# Patient Record
Sex: MALE | Race: WHITE | HISPANIC OR LATINO | Employment: OTHER | URBAN - METROPOLITAN AREA
[De-identification: names, ages, dates, MRNs, and addresses within clinical notes are randomized per-mention and may not be internally consistent; named-entity substitution may affect disease eponyms.]

---

## 2017-02-03 ENCOUNTER — ALLSCRIPTS OFFICE VISIT (OUTPATIENT)
Dept: OTHER | Facility: OTHER | Age: 82
End: 2017-02-03

## 2017-02-03 ENCOUNTER — GENERIC CONVERSION - ENCOUNTER (OUTPATIENT)
Dept: OTHER | Facility: OTHER | Age: 82
End: 2017-02-03

## 2017-02-03 LAB — HBA1C MFR BLD HPLC: 7.4 %

## 2017-02-06 ENCOUNTER — ALLSCRIPTS OFFICE VISIT (OUTPATIENT)
Dept: OTHER | Facility: OTHER | Age: 82
End: 2017-02-06

## 2017-02-06 DIAGNOSIS — N18.4 CHRONIC KIDNEY DISEASE, STAGE IV (SEVERE) (HCC): ICD-10-CM

## 2017-02-06 DIAGNOSIS — N25.81 SECONDARY HYPERPARATHYROIDISM OF RENAL ORIGIN (HCC): ICD-10-CM

## 2017-02-08 ENCOUNTER — TRANSCRIBE ORDERS (OUTPATIENT)
Dept: LAB | Facility: HOSPITAL | Age: 82
End: 2017-02-08

## 2017-02-08 ENCOUNTER — APPOINTMENT (OUTPATIENT)
Dept: LAB | Facility: HOSPITAL | Age: 82
End: 2017-02-08
Attending: INTERNAL MEDICINE
Payer: COMMERCIAL

## 2017-02-08 DIAGNOSIS — N18.4 CHRONIC KIDNEY DISEASE, STAGE IV (SEVERE) (HCC): ICD-10-CM

## 2017-02-08 DIAGNOSIS — N25.81 SECONDARY HYPERPARATHYROIDISM OF RENAL ORIGIN (HCC): ICD-10-CM

## 2017-02-08 LAB
ANION GAP SERPL CALCULATED.3IONS-SCNC: 7 MMOL/L (ref 4–13)
BUN SERPL-MCNC: 33 MG/DL (ref 5–25)
CALCIUM SERPL-MCNC: 8.6 MG/DL (ref 8.3–10.1)
CHLORIDE SERPL-SCNC: 114 MMOL/L (ref 100–108)
CO2 SERPL-SCNC: 24 MMOL/L (ref 21–32)
CREAT SERPL-MCNC: 2.6 MG/DL (ref 0.6–1.3)
GFR SERPL CREATININE-BSD FRML MDRD: 23.9 ML/MIN/1.73SQ M
GLUCOSE SERPL-MCNC: 247 MG/DL (ref 65–140)
MAGNESIUM SERPL-MCNC: 1.9 MG/DL (ref 1.6–2.6)
PHOSPHATE SERPL-MCNC: 2.3 MG/DL (ref 2.3–4.1)
POTASSIUM SERPL-SCNC: 4.1 MMOL/L (ref 3.5–5.3)
PTH-INTACT SERPL-MCNC: 67.4 PG/ML (ref 14–72)
SODIUM SERPL-SCNC: 145 MMOL/L (ref 136–145)

## 2017-02-08 PROCEDURE — 84100 ASSAY OF PHOSPHORUS: CPT

## 2017-02-08 PROCEDURE — 80048 BASIC METABOLIC PNL TOTAL CA: CPT

## 2017-02-08 PROCEDURE — 83735 ASSAY OF MAGNESIUM: CPT

## 2017-02-08 PROCEDURE — 83970 ASSAY OF PARATHORMONE: CPT

## 2017-02-08 PROCEDURE — 36415 COLL VENOUS BLD VENIPUNCTURE: CPT

## 2017-03-17 ENCOUNTER — ALLSCRIPTS OFFICE VISIT (OUTPATIENT)
Dept: OTHER | Facility: OTHER | Age: 82
End: 2017-03-17

## 2017-03-28 ENCOUNTER — RX ONLY (RX ONLY)
Age: 82
End: 2017-03-28

## 2017-03-28 ENCOUNTER — DOCTOR'S OFFICE (OUTPATIENT)
Dept: URBAN - METROPOLITAN AREA CLINIC 136 | Facility: CLINIC | Age: 82
Setting detail: OPHTHALMOLOGY
End: 2017-03-28
Payer: COMMERCIAL

## 2017-03-28 DIAGNOSIS — E11.3313: ICD-10-CM

## 2017-03-28 DIAGNOSIS — H34.8112: ICD-10-CM

## 2017-03-28 DIAGNOSIS — E11.3312: ICD-10-CM

## 2017-03-28 DIAGNOSIS — E10.36: ICD-10-CM

## 2017-03-28 DIAGNOSIS — E13.36: ICD-10-CM

## 2017-03-28 DIAGNOSIS — E11.3311: ICD-10-CM

## 2017-03-28 PROCEDURE — 92134 CPTRZ OPH DX IMG PST SGM RTA: CPT | Performed by: OPHTHALMOLOGY

## 2017-03-28 PROCEDURE — 92004 COMPRE OPH EXAM NEW PT 1/>: CPT | Performed by: OPHTHALMOLOGY

## 2017-03-28 PROCEDURE — 92250 FUNDUS PHOTOGRAPHY W/I&R: CPT | Performed by: OPHTHALMOLOGY

## 2017-03-28 PROCEDURE — 92235 FLUORESCEIN ANGRPH MLTIFRAME: CPT | Performed by: OPHTHALMOLOGY

## 2017-03-28 ASSESSMENT — CONFRONTATIONAL VISUAL FIELD TEST (CVF)
OS_FINDINGS: SUPERONASAL DEFECT
OD_FINDINGS: SUPERONASAL DEFECT, INFERONASAL DEFECT

## 2017-03-28 ASSESSMENT — CORNEAL SURGICAL SCARRING: OS_SCARRING: ANTERIOR STROMAL

## 2017-03-31 PROBLEM — E11.3313 DM TYPE 2; BOTH MOD WITH ME: Status: ACTIVE | Noted: 2017-03-28

## 2017-03-31 PROBLEM — H34.8112: Status: ACTIVE | Noted: 2017-03-28

## 2017-03-31 PROBLEM — E11.36 CATARACT DIABETIC: Status: ACTIVE | Noted: 2017-03-28

## 2017-03-31 PROBLEM — H43.813 POSTERIOR VITREOUS DETACHMENT; BOTH EYES: Status: ACTIVE | Noted: 2017-03-28

## 2017-03-31 PROBLEM — E13.36 CATARACT DIABETIC: Status: ACTIVE | Noted: 2017-03-28

## 2017-03-31 PROBLEM — E10.36 CATARACT DIABETIC: Status: ACTIVE | Noted: 2017-03-28

## 2017-03-31 ASSESSMENT — REFRACTION_MANIFEST
OD_VA2: 20/
OS_VA1: 20/
OS_VA2: 20/
OD_VA1: 20/
OU_VA: 20/
OD_VA3: 20/
OU_VA: 20/
OS_VA3: 20/
OD_VA1: 20/
OS_VA1: 20/
OS_VA3: 20/
OD_VA3: 20/
OS_VA2: 20/
OD_VA2: 20/
OD_VA2: 20/
OD_VA3: 20/
OS_VA3: 20/
OS_VA2: 20/
OS_VA1: 20/
OU_VA: 20/
OD_VA1: 20/

## 2017-03-31 ASSESSMENT — REFRACTION_CURRENTRX
OS_OVR_VA: 20/
OS_OVR_VA: 20/
OD_OVR_VA: 20/
OS_OVR_VA: 20/
OD_OVR_VA: 20/
OD_OVR_VA: 20/

## 2017-03-31 ASSESSMENT — VISUAL ACUITY
OS_BCVA: 20/300
OD_BCVA: 20/30-1

## 2017-05-01 ENCOUNTER — ALLSCRIPTS OFFICE VISIT (OUTPATIENT)
Dept: OTHER | Facility: OTHER | Age: 82
End: 2017-05-01

## 2017-05-24 ENCOUNTER — ALLSCRIPTS OFFICE VISIT (OUTPATIENT)
Dept: OTHER | Facility: OTHER | Age: 82
End: 2017-05-24

## 2017-05-24 DIAGNOSIS — N18.4 CHRONIC KIDNEY DISEASE, STAGE IV (SEVERE) (HCC): ICD-10-CM

## 2017-05-24 LAB — HBA1C MFR BLD HPLC: 7.6 %

## 2017-05-31 ENCOUNTER — APPOINTMENT (OUTPATIENT)
Dept: LAB | Facility: HOSPITAL | Age: 82
End: 2017-05-31
Payer: COMMERCIAL

## 2017-05-31 DIAGNOSIS — N18.4 CHRONIC KIDNEY DISEASE, STAGE IV (SEVERE) (HCC): ICD-10-CM

## 2017-05-31 LAB
ANION GAP SERPL CALCULATED.3IONS-SCNC: 8 MMOL/L (ref 4–13)
BUN SERPL-MCNC: 41 MG/DL (ref 5–25)
CALCIUM SERPL-MCNC: 8.8 MG/DL (ref 8.3–10.1)
CHLORIDE SERPL-SCNC: 117 MMOL/L (ref 100–108)
CO2 SERPL-SCNC: 19 MMOL/L (ref 21–32)
CREAT SERPL-MCNC: 2.66 MG/DL (ref 0.6–1.3)
GFR SERPL CREATININE-BSD FRML MDRD: 23.2 ML/MIN/1.73SQ M
GLUCOSE P FAST SERPL-MCNC: 89 MG/DL (ref 65–99)
POTASSIUM SERPL-SCNC: 4 MMOL/L (ref 3.5–5.3)
SODIUM SERPL-SCNC: 144 MMOL/L (ref 136–145)

## 2017-05-31 PROCEDURE — 36415 COLL VENOUS BLD VENIPUNCTURE: CPT

## 2017-05-31 PROCEDURE — 80048 BASIC METABOLIC PNL TOTAL CA: CPT

## 2017-06-01 ENCOUNTER — ALLSCRIPTS OFFICE VISIT (OUTPATIENT)
Dept: OTHER | Facility: OTHER | Age: 82
End: 2017-06-01

## 2017-06-30 DIAGNOSIS — E55.9 VITAMIN D DEFICIENCY: ICD-10-CM

## 2017-06-30 DIAGNOSIS — N25.81 SECONDARY HYPERPARATHYROIDISM OF RENAL ORIGIN (HCC): ICD-10-CM

## 2017-08-01 DIAGNOSIS — E11.9 TYPE 2 DIABETES MELLITUS WITHOUT COMPLICATIONS (HCC): ICD-10-CM

## 2017-08-01 DIAGNOSIS — E78.5 HYPERLIPIDEMIA: ICD-10-CM

## 2017-08-01 DIAGNOSIS — I73.9 PERIPHERAL VASCULAR DISEASE (HCC): ICD-10-CM

## 2017-08-01 DIAGNOSIS — N18.4 CHRONIC KIDNEY DISEASE, STAGE IV (SEVERE) (HCC): ICD-10-CM

## 2017-08-01 DIAGNOSIS — N25.81 SECONDARY HYPERPARATHYROIDISM OF RENAL ORIGIN (HCC): ICD-10-CM

## 2017-08-10 ENCOUNTER — ALLSCRIPTS OFFICE VISIT (OUTPATIENT)
Dept: OTHER | Facility: OTHER | Age: 82
End: 2017-08-10

## 2017-08-21 ENCOUNTER — GENERIC CONVERSION - ENCOUNTER (OUTPATIENT)
Dept: OTHER | Facility: OTHER | Age: 82
End: 2017-08-21

## 2017-09-12 ENCOUNTER — HOSPITAL ENCOUNTER (OUTPATIENT)
Dept: NON INVASIVE DIAGNOSTICS | Facility: CLINIC | Age: 82
Discharge: HOME | End: 2017-09-12
Payer: COMMERCIAL

## 2017-09-12 DIAGNOSIS — I73.9 PERIPHERAL VASCULAR DISEASE (HCC): ICD-10-CM

## 2017-09-12 PROCEDURE — 93925 LOWER EXTREMITY STUDY: CPT

## 2017-09-12 PROCEDURE — 93923 UPR/LXTR ART STDY 3+ LVLS: CPT

## 2017-09-18 ENCOUNTER — TRANSCRIBE ORDERS (OUTPATIENT)
Dept: LAB | Facility: HOSPITAL | Age: 82
End: 2017-09-18

## 2017-09-18 ENCOUNTER — APPOINTMENT (OUTPATIENT)
Dept: LAB | Facility: HOSPITAL | Age: 82
End: 2017-09-18
Payer: COMMERCIAL

## 2017-09-18 DIAGNOSIS — E11.9 TYPE 2 DIABETES MELLITUS WITHOUT COMPLICATIONS (HCC): ICD-10-CM

## 2017-09-18 DIAGNOSIS — E78.5 HYPERLIPIDEMIA: ICD-10-CM

## 2017-09-18 DIAGNOSIS — N18.4 CHRONIC KIDNEY DISEASE, STAGE IV (SEVERE) (HCC): ICD-10-CM

## 2017-09-18 LAB
ANION GAP SERPL CALCULATED.3IONS-SCNC: 7 MMOL/L (ref 4–13)
BUN SERPL-MCNC: 40 MG/DL (ref 5–25)
CALCIUM SERPL-MCNC: 8.9 MG/DL (ref 8.3–10.1)
CHLORIDE SERPL-SCNC: 116 MMOL/L (ref 100–108)
CHOLEST SERPL-MCNC: 117 MG/DL (ref 50–200)
CO2 SERPL-SCNC: 20 MMOL/L (ref 21–32)
CREAT SERPL-MCNC: 2.88 MG/DL (ref 0.6–1.3)
CREAT UR-MCNC: <13 MG/DL
EST. AVERAGE GLUCOSE BLD GHB EST-MCNC: 200 MG/DL
GFR SERPL CREATININE-BSD FRML MDRD: 20 ML/MIN/1.73SQ M
GLUCOSE P FAST SERPL-MCNC: 156 MG/DL (ref 65–99)
HBA1C MFR BLD: 8.6 % (ref 4.2–6.3)
HDLC SERPL-MCNC: 26 MG/DL (ref 40–60)
LDLC SERPL CALC-MCNC: 51 MG/DL (ref 0–100)
MICROALBUMIN UR-MCNC: 57.7 MG/L (ref 0–20)
MICROALBUMIN/CREAT 24H UR: >444 MG/G CREATININE (ref 0–30)
PHOSPHATE SERPL-MCNC: 1.9 MG/DL (ref 2.3–4.1)
POTASSIUM SERPL-SCNC: 3.8 MMOL/L (ref 3.5–5.3)
PTH-INTACT SERPL-MCNC: 51.6 PG/ML (ref 14–72)
SODIUM SERPL-SCNC: 143 MMOL/L (ref 136–145)
TRIGL SERPL-MCNC: 201 MG/DL

## 2017-09-18 PROCEDURE — 36415 COLL VENOUS BLD VENIPUNCTURE: CPT

## 2017-09-18 PROCEDURE — 83970 ASSAY OF PARATHORMONE: CPT

## 2017-09-18 PROCEDURE — 80061 LIPID PANEL: CPT

## 2017-09-18 PROCEDURE — 84100 ASSAY OF PHOSPHORUS: CPT

## 2017-09-18 PROCEDURE — 82570 ASSAY OF URINE CREATININE: CPT

## 2017-09-18 PROCEDURE — 80048 BASIC METABOLIC PNL TOTAL CA: CPT

## 2017-09-18 PROCEDURE — 82043 UR ALBUMIN QUANTITATIVE: CPT

## 2017-09-18 PROCEDURE — 83036 HEMOGLOBIN GLYCOSYLATED A1C: CPT

## 2017-09-21 ENCOUNTER — ALLSCRIPTS OFFICE VISIT (OUTPATIENT)
Dept: OTHER | Facility: OTHER | Age: 82
End: 2017-09-21

## 2017-10-16 ENCOUNTER — APPOINTMENT (OUTPATIENT)
Dept: LAB | Facility: HOSPITAL | Age: 82
End: 2017-10-16
Attending: INTERNAL MEDICINE
Payer: COMMERCIAL

## 2017-10-16 ENCOUNTER — TRANSCRIBE ORDERS (OUTPATIENT)
Dept: LAB | Facility: HOSPITAL | Age: 82
End: 2017-10-16

## 2017-10-16 DIAGNOSIS — I10 ESSENTIAL (PRIMARY) HYPERTENSION: ICD-10-CM

## 2017-10-16 DIAGNOSIS — N18.4 CHRONIC KIDNEY DISEASE, STAGE IV (SEVERE) (HCC): ICD-10-CM

## 2017-10-16 DIAGNOSIS — E55.9 VITAMIN D DEFICIENCY: ICD-10-CM

## 2017-10-16 LAB
25(OH)D3 SERPL-MCNC: 32.7 NG/ML (ref 30–100)
ANION GAP SERPL CALCULATED.3IONS-SCNC: 10 MMOL/L (ref 4–13)
BUN SERPL-MCNC: 38 MG/DL (ref 5–25)
CALCIUM SERPL-MCNC: 8.6 MG/DL (ref 8.3–10.1)
CHLORIDE SERPL-SCNC: 117 MMOL/L (ref 100–108)
CO2 SERPL-SCNC: 17 MMOL/L (ref 21–32)
CREAT SERPL-MCNC: 2.86 MG/DL (ref 0.6–1.3)
CREAT UR-MCNC: 62.6 MG/DL
GFR SERPL CREATININE-BSD FRML MDRD: 20 ML/MIN/1.73SQ M
GLUCOSE P FAST SERPL-MCNC: 146 MG/DL (ref 65–99)
POTASSIUM SERPL-SCNC: 3.5 MMOL/L (ref 3.5–5.3)
PROT UR-MCNC: 73 MG/DL
PROT/CREAT UR: 1.17 MG/G{CREAT} (ref 0–0.1)
SODIUM SERPL-SCNC: 144 MMOL/L (ref 136–145)

## 2017-10-16 PROCEDURE — 84156 ASSAY OF PROTEIN URINE: CPT

## 2017-10-16 PROCEDURE — 82306 VITAMIN D 25 HYDROXY: CPT

## 2017-10-16 PROCEDURE — 36415 COLL VENOUS BLD VENIPUNCTURE: CPT

## 2017-10-16 PROCEDURE — 80048 BASIC METABOLIC PNL TOTAL CA: CPT

## 2017-10-16 PROCEDURE — 82570 ASSAY OF URINE CREATININE: CPT

## 2017-10-18 ENCOUNTER — GENERIC CONVERSION - ENCOUNTER (OUTPATIENT)
Dept: OTHER | Facility: OTHER | Age: 82
End: 2017-10-18

## 2017-10-27 NOTE — PROGRESS NOTES
Assessment  1  Chronic kidney disease, stage 4 (severe) (585 4) (N18 4)   2  Benign essential hypertension (401 1) (I10)   3  Hypomagnesemia (275 2) (E83 42)   4  Metabolic acidosis (578 5) (E87 2)   5  Vitamin D deficiency (268 9) (E55 9)   6  Secondary hyperparathyroidism of renal origin (588 81) (N25 81)    Plan  Chronic kidney disease, stage 4 (severe)    · Sodium Bicarbonate 650 MG Oral Tablet; TAKE 2 TABLETS TWICE DAILY WITH  MEALS   Rx By: Bright Biswas; Dispense: 30 Days ; #:120 Tablet; Refill: 3;For: Chronic kidney disease, stage 4 (severe); CHERELLE = N; Verified Transmission to [facility] ; Last Updated By: [employer]; 9/21/2017 2:21:13 PM   · (1) BASIC METABOLIC PROFILE; Status:Active; Requested WBP:[id number];    Perform:[facility]; Due:16Oct2018; Ordered; For:Chronic kidney disease, stage 4 (severe); Ordered By:Shoshana Estevez;   · (1) URINE PROTEIN CREATININE RATIO; Status:Active; Requested OYU:[id number];    Perform:[facility]; Due:16Oct2018; Ordered; For:Chronic kidney disease, stage 4 (severe); Ordered By:Shoshana Estevez;  Vitamin D deficiency    · (1) VITAMIN D 25-HYDROXY; Status:Active; Requested XED:[id number];    Perform:[facility]; Due:16Oct2018; Ordered; For:Vitamin D deficiency; Ordered By:Shoshana Estevez;    Discussion/Summary    1  CKD stage 4 likely due to diabetes/hypertension/hydronephrosis s/p urostomy, sCr 2 88 Sept 2017 with eGFR 23ml/min per CKD EPI equationis chronic and follows with urology, s/p urostomy as bladder removednote, 10 cm kidneys b/l with increased echogenicity bilaterally as well on renal u/s performed March 2016  Hep B/C, complements, ANCA and IgA negative/normalto avoid NSAIDs(ibuprofen, aleve, advil, motrin)to kidney smart education - Amharic speaking only, needs phone number updatedfor repeat BMP monthly, next in October 2017would not want dialysis if needed as 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  hypertension - BP well controlled for age, no longer on lisinopril  metabolic acidosis - was not taking sodium bicarbonate as he was told by Dr Kinga Johnson not to take this  His bicarb level has dropped  Will restart sodium bicarbonate 1300mg twice dailyF/u repeat BMP in 1 month to assess need for change  secondary hyperparathyroidism of renal origin - PTH normalized with low phos  - PTH 67-->51 6, phos 1 9  vitamin D level as never checked  proteinuria - UpCr 0 78, his is likely d/t diabetic nephropathy  off ACEI due to normal BP  Will repeat UpCr and restart low dose ACEi if needed  DM2 - last A1C 7 6--> 8 6 (uncontrolled)   continue lantus and humalog per primary doctor  Need adequate glucose control to slow progression of CKD  vitamin D deficiency - takes vitamin D every 2 weeks but is unsure as medication list is old  F/u vitamin D level  in 2-3 months with repeat BMP q1-2 months - next check mid October 2017  to bring medications to the office with you  The patient was counseled regarding diagnostic results,-- instructions for management,-- prognosis  Indication for Services: CKD Stage 4/5  Reason For Visit  CKD4      History of Present Illness  82 yo M with hx of CKD stage 4, DM2, HTN, bladder cancer presents in follow up of CKD stage 4  Una Pearce [id number] feels well today  recent hospitalization/illnesses  No recent medication changes  His insulin dose at night was changed 2 weeks ago  - insulin recently changed  He feels the change in insulin is not helping and his blood sugars are high  - well controlledbladder and prostate cancer s/p surgery - follows with urology      Review of Systems    Constitutional: no fever-- and-- no chills  Integumentary: no rashes  Gastrointestinal: no abdominal pain,-- no constipation,-- no nausea,-- no diarrhea-- and-- no vomiting  Respiratory: cough-- and-- +has a cold, but-- no shortness of breath     Cardiovascular: LE edema better first thing in the morning-- and-- lower extremity edema, but-- no chest pain  Musculoskeletal: +neck pain, but-- no joint pain-- and-- no joint swelling  Neurological: no headache,-- no lightheadedness-- and-- no dizziness  Genitourinary: +urostomy, but-- no dysuria-- and-- no hematuria  Eyes: eyesight problems-- and-- +decreased visiion  ENT: hearing loss  Psychiatric: no anxiety-- and-- no depression  ROS reviewed  Past Medical History    The active problems and past medical history were reviewed and updated today  Surgical History    The surgical history was reviewed and updated today  Family History    The family history was reviewed and updated today  Social History  The social history was reviewed and updated today  The social history was reviewed and is unchanged  Current Meds   1  Albuterol Sulfate (5 MG/ML) 0 5% Inhalation Nebulization Solution; USE AS DIRECTED; Therapy: 92TWJ7781 to (Last Rx:32Gjl6541)  Requested for: 53Tad5654 Ordered   2  Aspirin 81 MG Oral Tablet Delayed Release; take 1 tablet every day; Therapy: 02DJA3223 to (Evaluate:83Cna6157)  Requested for: 53Rzi3515; Last   Rx:12Owc9027 Ordered   3  FreeStyle Lancets Miscellaneous; TESTING THREE TIMES A DAY; Therapy: 66SZJ8851 to ([id number])  Requested for: 31Hlb9185; Last   Rx:24Fub7620 Ordered   4  FreeStyle Lite Device; Test blood sugar three times daily; Therapy: 03LNO2910 to (Rojas Toscano)  Requested for: 40NPM7828; Last   Rx:06Nbg3918 Ordered   5  FreeStyle Lite Test In Vitro Strip; TEST three times a day; Therapy: 96HPR3689 to (Tayler Cavazos)  Requested for: 99SIV8651; Last   Rx:22Wdp3258 Ordered   6  HumaLOG KwikPen 100 UNIT/ML Subcutaneous Solution Pen-injector; INJECT   SUBCUTANEOUSLY AS DIRECTED  TDD:30 units; Therapy: 76Mkt5373 to (Last [facility])  Requested for: 32Lzb7636; Status:   ACTIVE - Transmit to [facility] Ordered   7   Pen Needles 31G X 6 MM Miscellaneous; USE AS DIRECTED; Therapy: 62FJL6496 to (Evaluate:21Mar2017)  Requested for: 41Ufo1143; Last   Rx:05Mpi3216; Status: ACTIVE - Transmit to Pharmacy - Awaiting Verification Ordered   8  Toujeo SoloStar 300 UNIT/ML Subcutaneous Solution Pen-injector; 60 units at bedtime; Therapy: 27BPF3456 to (Evaluate:35Dqb6300)  Requested for: 77SSA3908; Last   Rx:20Ial8559 Ordered   9  Vitamin D3 56534 UNIT Oral Capsule; take 1 capsule weekly for 8 weeks; Therapy: 75SKI6756 to (Evaluate:08Jun2016)  Requested for: 11XMR2093; Last   Rx:05Afk5061 Ordered    The medication list was reviewed and updated today  Allergies  1  Aspir-81 TBEC    Vitals  Vital Signs    Recorded: 14EXV3624 93:42ZL   Systolic 623   Diastolic 80   Height 5 ft 9 in   Weight 184 lb 4 00 oz   BMI Calculated 27 21   BSA Calculated 1 99     Physical Exam    Constitutional: General appearance: No acute distress, well appearing and well nourished  ENT: External ears and nose appear normal      Eyes: Anicteric sclerae  Pulmonary: Respiratory effort: No increased work of breathing or signs of respiratory distress  -- Auscultation of lungs: Clear to auscultation  Cardiovascular: Auscultation of heart: Normal rate and rhythm, normal S1 and S2, without murmurs  Abdomen: Abnormal  -- +urostomy  Extremities: No cyanosis, clubbing or edema  Rash: No rash present     Neurologic: Non Focal      Psychiatric: Orientation to person, place, and time: Normal  -- and-- Mood and affect: Normal        Results/Data  (1) BASIC METABOLIC PROFILE 47SXZ8167 08:51AM Roldant[facility] Ernandezs Order Number: [id number]     Test Name Result Flag Reference   SODIUM 143 mmol/L  136-145   POTASSIUM 3 8 mmol/L  3 5-5 3   CHLORIDE 116 mmol/L H 100-108   CARBON DIOXIDE 20 mmol/L L 21-32   ANION GAP (CALC) 7 mmol/L  4-13   BLOOD UREA NITROGEN 40 mg/dL H 5-25   CREATININE 2 88 mg/dL H 0 60-1 30   Standardized to IDMS reference method   CALCIUM 8 9 mg/dL 8  3-10 1   eGFR 20 ml/min/1 73sq m     [facility] Disease Education Program recommendations are as follows:  GFR calculation is accurate only with a steady state creatinine  Chronic Kidney disease less than 60 ml/min/1 73 sq  meters  Kidney failure less than 15 ml/min/1 73 sq  meters  GLUCOSE FASTING 156 mg/dL H 65-99   Specimen collection should occur prior to Sulfasalazine administration due to the potential for falsely depressed results  Specimen collection should occur prior to Sulfapyridine administration due to the potential for falsely elevated results  (1) HEMOGLOBIN A1C 57Igd6741 08:51AM Electa Graft   TW Order Number: [id number]     Test Name Result Flag Reference   HEMOGLOBIN A1C 8 6 % H 4 2-6 3   EST  AVG  GLUCOSE 200 mg/dl       (1) LIPID PANEL FASTING W DIRECT LDL REFLEX 98Xpv1351 08:51AM [facility] Order Number: [id number]     Test Name Result Flag Reference   CHOLESTEROL 117 mg/dL     LDL CHOLESTEROL CALCULATED 51 mg/dL  0-100   Triglyceride:        Normal <150 mg/dl   Borderline High 150-199 mg/dl   High 200-499 mg/dl   Very High >499 mg/dl      Cholesterol:       Desirable <200 mg/dl    Borderline High 200-239 mg/dl    High >239 mg/dl      HDL Cholesterol:       High>59 mg/dL    Low <41 mg/dL      HDL Cholesterol:       High>59 mg/dL    Low <41 mg/dL      This screening LDL is a calculated result  It does not have the accuracy of the Direct Measured LDL in the monitoring of patients with hyperlipidemia and/or statin therapy  Direct Measure LDL (VEO943) must be ordered separately in these patients  TRIGLYCERIDES 201 mg/dL H <=150   Specimen collection should occur prior to N-Acetylcysteine or Metamizole administration due to the potential for falsely depressed results  HDL,DIRECT 26 mg/dL L 40-60   Specimen collection should occur prior to Metamizole administration due to the potential for falsley depressed results       (1) MICROALBUMIN CREATININE RATIO, RANDOM URINE 62Prz7353 08:51AM [employer] [facility] Order Number: [id number]     Test Name Result Flag Reference   MICROALBUMIN/ CREAT R >444 mg/g creatinine H 0-30   MICROALBUMIN,URINE 57 7 mg/L H 0 0-20 0   CREATININE URINE <13 0 mg/dL       (1) PTH N-TERMINAL (INTACT) 18Sep2017 08:51AM [employer] [facility] Order Number: [id number]     Test Name Result Flag Reference   PARATHYROID HORMONE INTACT 51 6 pg/mL  14 0-72 0     (1) PHOSPHORUS 23Lms6975 08:51AM [employer] [facility] Order Number: [id number]     Test Name Result Flag Reference   PHOSPHORUS 1 9 mg/dL L 2 3-4 1     VAS LOWER LIMB ARTERIAL DUPLEX, COMPLETE BILATERAL/GRAFTS 12Sep2017 01:17PM Noa Aaron Order Number: [id number]    - Patient Instructions: To schedule this appointment, please contact Central Scheduling at [phone]  Test Name Result Flag Reference   VAS LOWER LIMB ARTERIAL DUPLEX, COMPLETE BILATERAL/GRAFTS (Report)     [facility] REPORT   CLINICAL:   Indications: Bilateral PVD, Unspecified [I73 9]  Pt  complaining of knee and ankle pain when walking  Risk Factors: The patient has history of Hypertension, Hyperlipidemia and   Diabetes (Yes)  Operative History   Appendectomy   Prostate surgery for cancer   Right Brachial Pressure: 129/ mmHg, Left Brachial Pressure: 133/ mmHg  FINDINGS:      Segment        Rig Left                        PSV PSV    Common Femoral Artery 104  85    Prox Profunda      85  80    Prox SFA        101 102    Mid SFA         96 119    Dist SFA        62  66    Proximal Pop      63  62    Distal Pop       73  83    Dist Post Tibial    99  57    Dist  Ant  Tibial    93 121             CONCLUSION:   Impression:   RIGHT LOWER LIMB:   Diffuse disease throughout the femoral and popliteal arteries without evidence   of a focal stenosis     Ankle/Brachial index: 1 32 (Normal Range)   PVR/ PPG tracings are normal    Metatarsal pressure of 144mmHg   Great toe pressure of 107mmHg, within the healing range   LEFT LOWER LIMB:   Diffuse disease throughout the femoral and popliteal arteries without evidence   of a focal stenosis  Ankle/Brachial index: 1 23 (Normal Range)   PVR/ PPG tracings are normal    Metatarsal pressure of 132mmHg   Great toe pressure of 80mmHg, within the healing range      SIGNATURE:   Electronically Signed by: Rosalinda Wu on 2017-09-12 07:25:55 PM       Health Management  Colon cancer screening   COLONOSCOPY; every 10 years; Next Due: 41ELL7936; Active  DM type 2 (diabetes mellitus, type 2)   (1) LIPID PANEL, FASTING; every 1 year; Last 51GOT3449; Next Due: 74Ahj8633; Overdue  (1) MICROALBUMIN CREATININE RATIO, RANDOM URINE; every 1 year; Last 20SNP5433; Next Due:  34Byc3447; Active  *VB - Eye Exam; every 2 years; Last 12OTB2460; Next Due: 92QUV7048; Active  *VB - Foot Exam; every 1 year; Last 28MEU6534; Next Due: 67RWV6879; Active  Hemoglobin A1c- POC; every 3 months; Last 82EOW7390; Next Due: 06Dpl4727; Overdue  Dyslipidemia   (1) LIPID PANEL, FASTING; every 1 year; Last 10MPF3424; Next Due: 09Vtt4381; Overdue  Need for Tdap vaccination   Tdap (Adacel); every 10 years; Next Due: 99MTN4764; Overdue  Need for Zostavax administration   Zoster (Zostavax); every 1 year; Next Due: 61ZQC7255; Overdue  Health Maintenance   Medicare Annual Wellness Visit; every 1 year; Last 88STF1036; Next Due: 55ZNB1976;  Overdue    Future Appointments    Date/Time Provider Specialty Site   11/07/2017 08:55 AM Specialty Clinic, Urology  [facility] [address]   11/06/2017 01:20 PM Fernie Jurado DO Internal Medicine [facility] [address] PCP   11/09/2017 11:15 AM Fernie Jurado DO Internal Medicine [facility] [address] PCP     Signatures   Electronically signed by : Stormy Currie DO; Sep 21 2017  2:24PM EST                       (Author)

## 2017-11-06 ENCOUNTER — ALLSCRIPTS OFFICE VISIT (OUTPATIENT)
Dept: OTHER | Facility: OTHER | Age: 82
End: 2017-11-06

## 2017-11-07 NOTE — PROGRESS NOTES
Assessment  1  Benign essential hypertension (401 1) (I10)   2  Chronic kidney disease, stage 4 (severe) (585 4) (N18 4)   3  DM type 2 (diabetes mellitus, type 2) (250 00) (E11 9)   4  Diabetic nephropathy (250 40,583 81) (E11 21)    Plan  Benign essential hypertension    · (1) CBC/ PLT (NO DIFF); Status:Active; Requested RQF:17NRN5145;    · (1) TSH WITH FT4 REFLEX; Status:Active; Requested CDB:13FDL1840;   Colostomy in place    · Mell Lynn MD, [facility] (Ophthalmology) Co-Management  *  Status: Hold For - Scheduling  Requested for:  54FFO9709  Care Summary provided  : Yes  DM type 2 (diabetes mellitus, type 2)    · Lisinopril 2 5 MG Oral Tablet; Take 1 tablet daily   · Vitamin D 1000 UNIT Oral Tablet; TAKE 1 TABLET BY MOUTH ONCE DAILY   · BD Pen Needle Mini U/F 31G X 5 MM Miscellaneous; use as directed FOUR TIMES A DAY TO  INJECT LANTUS AND HUMALOG INSULIN   · FreeStyle Lancets Miscellaneous; TESTING THREE TIMES A DAY   · FreeStyle Lite Test In Vitro Strip; TEST three times a day   · Follow-up visit in 3 months Evaluation and Treatment  Follow-up  Status: Hold For - Scheduling   Requested for: 79TFS1710   · *1 - [facility] Co-Management  eval DM II insulin in 1 month  Status: Hold For -  Scheduling  Requested for: 04JRE6525  Care Summary provided  : Yes  DM type 2 (diabetes mellitus, type 2), Peripheral vascular disease    · Aspirin 81 MG Oral Tablet Delayed Release; take 1 tablet every day  Vitamin D deficiency    · Vitamin D3 46570 UNIT Oral Capsule    Discussion/Summary  Discussion Summary:   check TSH and CBC  DM II- A1c 8 6% this Sept  Sugar log (checks once daily in morning) shows mid-high 100s, but at times 2-300s  Saw podiatry in August 2017  Micro:Cr done 9/17  Saw [facility] on 11/16- due for annual, gave script  Want to avoid hypoglycemia, as had this in the past  Will re-refer to Pharmacy in 1 month for adjustment of regimen  He currently takes Humalog 10 in morning, and Toujeo 50 at night   Will change to Toujeo 60 at night, Humalog 10 with biggest meal of day  Check sugars BID - AM and PM   HTN: Well-controlled  Continue low dose Lisinopril  HL: Continue pravastatin  Lipids done 9/17  CKD IV: Follows with Nephro, due to see them in Dec Hx bladder cancer s/p cystoprostatectomy, chronic hydronephrosis in 2003: Appt Nov 30th with Urology  G1 Diastolic CHF: Per ECHO 3981  Stable, asx  RTC 3 months  Td done 8/16  PCV, PPSV done 7085-4155  May need Zoster  Counseling Documentation With Imm: The patient, patient's family was counseled regarding instructions for management,-- risk factor reductions,-- risks and benefits of treatment options,-- importance of compliance with treatment  total time of encounter was 30 minutes  Medication SE Review and Pt Understands Tx: Possible side effects of new medications were reviewed with the patient/guardian today  The treatment plan was reviewed with the patient/guardian  The patient/guardian understands and agrees with the treatment plan      Chief Complaint  Chief Complaint Chronic Condition [facility] Records: Patient is here today for follow up of chronic conditions described in HPI  History of Present Illness  HPI: Pt here for 3 month follow-up  He states he is doing so-so  He feels a little tired/fatigued, but otherwise has no complaints other than dypsnea on exertion  BP is stable today  Had a urine protein test recently which did show protein in his urine  He follows with Nephrology regularly  He also has an appt coming up with Urology  He continues to take lisinopril  Sugar logs have been showing average high 100s, at times 200s and three hundreds  He has been taking Toujeo 50 at night and Humalog 10 in the morning  No reported dizziness or episodes of hypoglycemia  Hospital Based Practices Required Assessment:   Pain Assessment   the patient states they do not have pain  (on a scale of 0 to 10, the patient rates the pain at 0 )    Prefered Language is  Angolan     Primary Language is  Indonesian  Review of Systems  Complete-Male:   Constitutional: no fever,-- not feeling poorly,-- no recent weight gain-- and-- no chills  Eyes: no eyesight problems  Cardiovascular: no chest pain-- and-- no palpitations  Respiratory: no shortness of breath-- and-- no cough  Gastrointestinal: no abdominal pain,-- no nausea,-- no vomiting,-- no constipation-- and-- no diarrhea  Genitourinary: no dysuria  Musculoskeletal: no arthralgias  Psychiatric: no anxiety-- and-- no depression  ROS Reviewed:   ROS reviewed  Active Problems  1  Adhesive bursitis of left shoulder (726 0) (M75 02)   2  Anemia of chronic disease (285 29) (D63 8)   3  Benign essential hypertension (401 1) (I10)   4  Bladder cancer (188 9) (C67 9)   5  Cervical spinal stenosis (723 0) (M48 02)   6  Chronic kidney disease, stage 4 (severe) (585 4) (N18 4)   7  Claudication (443 9) (I73 9)   8  Colon cancer screening (V76 51) (Z12 11)   9  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 3)   10  Constipation (564 00) (K59 00)   11  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)   12  Diabetic nephropathy (250 40,583 81) (E11 21)   13  Diastolic dysfunction (415 4) (I51 9)   14  Dizziness (780 4) (R42)   15  DM type 2 (diabetes mellitus, type 2) (250 00) (E11 9)   16  Dyslipidemia (272 4) (E78 5)   17  Encounter for screening colonoscopy (V76 51) (Z12 11)   18  Hypomagnesemia (275 2) (E83 42)   19  Leg skin lesion, right (709 9) (L98 9)   20  Lower extremity ulceration (707 10) (L97 909)   21  Metabolic acidosis (089 0) (E87 2)   22  Microscopic hematuria (599 72) (R31 29)   23  Need for prophylactic vaccination and inoculation against influenza (V04 81) (Z23)   24  Need for Tdap vaccination (V06 1) (Z23)   25  Need for vaccination with 13-polyvalent pneumococcal conjugate vaccine (V03 82) (Z23)   26  Need for Zostavax administration (V04 89) (Z23)   27  Non compliance w medication regimen (V15 81) (Z91 14)   28   Peripheral neuropathy (356 9) (G62 9)   29  Peripheral vascular disease (443 9) (I73 9)   30  Seborrheic keratosis (702 19) (L82 1)   31  Secondary hyperparathyroidism of renal origin (588 81) (N25 81)   32  URI (upper respiratory infection) (465 9) (J06 9)   33  Vitamin D deficiency (268 9) (E55 9)    Past Medical History  1  History of Bladder Cancer (V10 51)   2  Urinary tract infection (599 0) (N39 0)  Active Problems And Past Medical History Reviewed: The active problems and past medical history were reviewed and updated today  Surgical History  1  History of Appendectomy   2  History of Bladder Surgery   3  History of Prostate Surgery  Surgical History Reviewed: The surgical history was reviewed and updated today  Family History  Mother    1  Family history of Diabetes Mellitus (V18 0)   2  Family history of Type 2 Diabetes Mellitus  Daughter    3  Family history of Diabetes Mellitus (V18 0)  Brother    4  Family history of Diabetes Mellitus (V18 0)  Family History    5  Family history of Diabetes Mellitus (V18 0)   6  Family history of cardiac disorder (V17 49) (Z82 49)  Family History Reviewed: The family history was reviewed and updated today  Social History   · Denied: Drug use   · Never A Smoker   · Never Drank Alcohol   · Second hand smoke exposure (V15 89) (Z77 22)  Social History Reviewed: The social history was reviewed and updated today  The social history was reviewed and is unchanged  Current Meds   1  Albuterol Sulfate (5 MG/ML) 0 5% Inhalation Nebulization Solution; USE AS DIRECTED; Therapy: 97MDS0519 to (Last Rx:43Kha5296)  Requested for: 68Hco4045 Ordered   2  Aspirin 81 MG Oral Tablet Delayed Release; take 1 tablet every day; Therapy: 24HEW1555 to (Evaluate:94Yfx4349)  Requested for: 43Ytu0482; Last Rx:35Dkw1739   Ordered   3   BD Pen Needle Mini U/F 31G X 5 MM Miscellaneous; use as directed FOUR TIMES A DAY TO INJECT   LANTUS AND HUMALOG INSULIN;   Therapy: 21GUD5394 to (NAPDYRMW:17YEZ0425)  Requested for: 45XVC1862; Last Rx:01Mct2250   Ordered   4  FreeStyle Lancets Miscellaneous; TESTING THREE TIMES A DAY; Therapy: 27AQV3932 to ([id number])  Requested for: 81Doj7213; Last Rx:61Vxi8033   Ordered   5  FreeStyle Lite Device; Test blood sugar three times daily; Therapy: 51SVJ9827 to (Too Lima)  Requested for: 23VVQ1814; Last Rx:32Gtj0266   Ordered   6  FreeStyle Lite Test In Vitro Strip; TEST three times a day; Therapy: 27MBM7265 to (Pancho Bennett)  Requested for: 73VRR2773; Last Rx:04Wpe6962   Ordered   7  HumaLOG KwikPen 100 UNIT/ML Subcutaneous Solution Pen-injector; Inject 10 units subcutaneously   three times a day with meals; Therapy: 87Oxg6221 to (Evaluate:86Kpk2552)  Requested for: 32Dgz7464; Last Rx:75Onp4147   Ordered   8  Sodium Bicarbonate 650 MG Oral Tablet; TAKE 2 TABLET 3 times daily; Therapy: 56LBC4432 to (Evaluate:04Aeu4269)  Requested for: 98LWC9762; Last Rx:91Iaf9845   Ordered   9  Toujeo SoloStar 300 UNIT/ML Subcutaneous Solution Pen-injector; 60 units at bedtime; Therapy: 76DNY9083 to (Evaluate:89Kre1334)  Requested for: 34YVV2275; Last Rx:14Chs1485   Ordered   10  Vitamin D3 02942 UNIT Oral Capsule; take 1 capsule weekly for 8 weeks; Therapy: 19YWZ8962 to (Evaluate:08Jun2016)  Requested for: 11UAF9233; Last Rx:68Yxz8298    Ordered    Allergies  1  Aspir-81 TBEC    Vitals  Vital Signs    Recorded: 73GGY5758 01:29PM   Temperature 98 3 F   Heart Rate 68   Systolic 494   Diastolic 70   Height 5 ft    Weight 176 lb 12 93 oz   BMI Calculated 34 53   BSA Calculated 1 77     Physical Exam    Constitutional   General appearance: No acute distress, well appearing and well nourished  Eyes   Pupils and irises: Equal, round and reactive to light  Ears, Nose, Mouth, and Throat   Nasal mucosa, septum, and turbinates: Normal without edema or erythema  Oropharynx: Normal with no erythema, edema, exudate or lesions      Pulmonary Auscultation of lungs: Clear to auscultation, equal breath sounds bilaterally, no wheezes, no rales, no rhonci  Cardiovascular   Palpation of heart: Normal PMI, no thrills  Abdomen   Abdomen: Non-tender, no masses  Musculoskeletal   Inspection/palpation of joints, bones, and muscles: Normal     Skin   Skin and subcutaneous tissue: Normal without rashes or lesions  Psychiatric   Orientation to person, place and time: Normal     Diabetic Foot Screen: Normal        Health Management  Colon cancer screening   COLONOSCOPY; every 10 years; Next Due: 94QIE4198; Active  DM type 2 (diabetes mellitus, type 2)   (1) LIPID PANEL, FASTING; every 1 year; Last 07JGN5559; Next Due: 00Uqx3989; Overdue  (1) MICROALBUMIN CREATININE RATIO, RANDOM URINE; every 1 year; Last 85WPV2101; Next Due:  00Gdx5541; Active  *VB - Eye Exam; every 2 years; Last 30LOX2686; Next Due: 64HSZ2496; Active  *VB - Foot Exam; every 1 year; Last 39GLJ6805; Next Due: 56KIL4280; Active  Hemoglobin A1c- POC; every 3 months; Last 25XHR7519; Next Due: 39Kas9019; Overdue  Dyslipidemia   (1) LIPID PANEL, FASTING; every 1 year; Last 54DBJ6395; Next Due: 14Nyo5580; Overdue  Need for Tdap vaccination   Tdap (Adacel); every 10 years; Next Due: 17RTK9512; Overdue  Need for Zostavax administration   Zoster (Zostavax); every 1 year; Next Due: 84NBU6093; Overdue  Health Maintenance   Medicare Annual Wellness Visit; every 1 year; Last 08LOP5119; Next Due: 99WBZ9168; Overdue    Attending Note  Attending Note: Attending Note: I discussed the case with the Resident and reviewed the Resident's note,-- I supervised the Resident-- and-- I agree with the Resident management plan as it was presented to me  Level of Participation: I was present in clinic, but did not examine the patient  I agree with the Resident's note        Future Appointments    Date/Time Provider Specialty Site   12/01/2017 04:00 PM Lesvia Mora DO [facility] Signatures   Electronically signed by : Soledad Rosenberg DO; Nov 6 2017  2:34PM EST                       (Author)    Electronically signed by : Sunny Matias DO; Nov 6 2017  2:37PM EST                       (Co-author)

## 2017-12-01 ENCOUNTER — GENERIC CONVERSION - ENCOUNTER (OUTPATIENT)
Dept: OTHER | Facility: OTHER | Age: 82
End: 2017-12-01

## 2018-01-09 NOTE — MISCELLANEOUS
Reason For Visit  Reason For Visit Free Text Note Form: Introduction of the Diabetic Education and Support Program     Case Management Documentation [facility]:   Information obtained from the patient, patient's spouse and medical record  Patient's financial status retired  He is also dealing with additional issues such as language/communication barrier, chronic/terminal disease and DM/HX CA /colostomy  Patient is participating in [place]  Action Plan: supportive counseling/advocacy and information provided  plan reviewed  Progress Note  S has met with pt and wife along with Desmond CASTILLO who assisted with translation  Pt resides with his supportive wife and they function well with some family assistance  Pt relates that their children help with rides or if they need assistance  Pt and wife are interested in the Diabetic classes  Pt has completed the [facility] Screening Assessment this date  S to remain available to support and assist as indicated  Active Problems    1  Adhesive bursitis of left shoulder (726 0) (M75 02)   2  Anemia of chronic disease (285 29) (D63 8)   3  Benign essential hypertension (401 1) (I10)   4  Bladder cancer (188 9) (C67 9)   5  Cervical spinal stenosis (723 0) (M48 02)   6  Chronic kidney disease, stage 4 (severe) (585 4) (N18 4)   7  Colon cancer screening (V76 51) (Z12 11)   8  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 3)   9  Constipation (564 00) (K59 00)   10  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)   11  Diabetic nephropathy (250 40,583 81) (E11 21)   12  Diastolic dysfunction (381 3) (I51 9)   13  Dizziness (780 4) (R42)   14  DM type 2 (diabetes mellitus, type 2) (250 00) (E11 9)   15  Dyslipidemia (272 4) (E78 5)   16  Encounter for screening colonoscopy (V76 51) (Z12 11)   17  Hypomagnesemia (275 2) (E83 42)   18  Leg skin lesion, right (709 9) (L98 9)   19  Lower extremity ulceration (707 10) (L97 909)   20   Metabolic acidosis (295 1) (E87 2)   21  Microscopic hematuria (599 72) (R31 29)   22  Need for prophylactic vaccination and inoculation against influenza (V04 81) (Z23)   23  Need for Tdap vaccination (V06 1) (Z23)   24  Need for vaccination with 13-polyvalent pneumococcal conjugate vaccine (V03 82) (Z23)   25  Need for Zostavax administration (V04 89) (Z23)   26  Non compliance w medication regimen (V15 81) (Z91 14)   27  Peripheral neuropathy (356 9) (G62 9)   28  Peripheral vascular disease (443 9) (I73 9)   29  Seborrheic keratosis (702 19) (L82 1)   30  Secondary hyperparathyroidism of renal origin (588 81) (N25 81)   31  URI (upper respiratory infection) (465 9) (J06 9)   32  Vitamin D deficiency (268 9) (E55 9)    Current Meds   1  Albuterol Sulfate (5 MG/ML) 0 5% Inhalation Nebulization Solution; USE AS DIRECTED; Therapy: 05MUB8163 to (Last Rx:96Qgx0710)  Requested for: 05Xgs7096 Ordered   2  Aspirin 81 MG Oral Tablet Delayed Release; take 1 tablet every day; Therapy: 79XNF8671 to (Evaluate:93Lzj6576)  Requested for: 08Axc8136; Last   Rx:85Hcl0123 Ordered   3  FreeStyle Lancets Miscellaneous; TESTING THREE TIMES A DAY; Therapy: 17SOG6572 to (Evaluate:72Pub0446)  Requested for: 43Ryw8681; Last   Rx:08Xfs8556 Ordered   4  FreeStyle Lite Device; Test blood sugar three times daily; Therapy: 27OSN9438 to (Allie Romo)  Requested for: 72PZS3072; Last   Rx:92Whl3996 Ordered   5  FreeStyle Lite Test In Vitro Strip; TEST BLOOS SUGAR 3 TIMES DAILY; Therapy: 38UCT3328 to (Evaluate:31Bux7833)  Requested for: 36LDJ0787; Last   Rx:33Mkn7297 Ordered   6  HumaLOG KwikPen 100 UNIT/ML Subcutaneous Solution Pen-injector; INJECT   SUBCUTANEOUSLY AS DIRECTED  TDD:30 units; Therapy: 64Fnu5228 to (Martinez Pan)  Requested for: 15Wwd0483; Status:   ACTIVE - Transmit to [facility] Verification Ordered   7  Lantus SoloStar 100 UNIT/ML Subcutaneous Solution Pen-injector; INJECT 60 UNIT   Daily TDD:60 unit;    Therapy: 87MYE9767 to (Evaluate:56Ily3960)  Requested for: 36Sme7429; Last   Rx:78Xxu5263; Status: [address] earline Martel Verification Ordered   8  Lisinopril 2 5 MG Oral Tablet; TAKE ONE TABLET BY MOUTH ONCE DAILY AS   DIRECTED; Therapy: 01DKL4122 to (Evaluate:93Emt1320)  Requested for: 95MKL0164; Last   Rx:56Fcn1165 Ordered   9  Mucinex 600 MG Oral Tablet Extended Release 12 Hour; TAKE 1 TABLET EVERY 12   HOURS AS NEEDED FOR CONGESTION; Therapy: 81EYU9899 to (Evaluate:73Rgz4241)  Requested for: 35XWJ2782; Last   Rx:11Mkn3510 Ordered   10  Pen Needles 31G X 6 MM Miscellaneous; USE AS DIRECTED; Therapy: 53DLJ2904 to (Evaluate:21Mar2017)  Requested for: 42Cic4484; Last    Rx:14Ump4084; Status: ACTIVE - Transmit to [facility] Verification Ordered   11  Pravastatin Sodium 20 MG Oral Tablet; take 1 tablet by mouth daily; Therapy: 66XVF2229 to (Evaluate:14Mar2017)  Requested for: 38Iny0125; Last    Rx:77Egv5603 Ordered   12  Robitussin Peak Cold -10 MG/5ML Oral Syrup; TAKE 10 ML  EVERY 4-6 HOURS    AS NEEDED; Therapy: 59IVO0455 to (Lennie Donis)  Requested for: 30RYE4402; Last    Rx:60Odt3410 Ordered   13  Sodium Bicarbonate 650 MG Oral Tablet; TAKE 1 TABLET 3 TIMES DAILY WITH MEALS; Therapy: 08WVC2628 to (Evaluate:72Rlk5511)  Requested for: 28Ezm5205 Recorded   14  Vitamin D3 33253 UNIT Oral Capsule; take 1 capsule weekly for 8 weeks; Therapy: 17EUB1083 to (Evaluate:08Jun2016)  Requested for: 17FPO4539; Last    Rx:50Qqx7506 Ordered    Allergies    1   Aspir-81 TBEC    Future Appointments    Date/Time Provider Specialty Site   02/06/2017 02:30 PM Melanie Bob DO Nephrology [facility]     Signatures   Electronically signed by : Leighann Ernandez LCSW; Feb  3 2017  1:52PM EST                       (Author)

## 2018-01-09 NOTE — MISCELLANEOUS
Message   Recorded as Task   Date: 03/18/2016 01:03 PM, Created By: Ghislaine Hudson   Task Name: Miscellaneous   Assigned To: Ghislaine Hudson   Regarding Patient: Tonie Shin, Status: In Progress   Comment:    Anamaria Islas - 18 Mar 2016 1:03 PM     TASK CREATED  Was able to get in contact with patient today regarding following up with a urologist    Ghislaine Hudson - 18 Mar 2016 1:03 PM     TASK IN PROGRESS        Active Problems    1  Abnormal finding on chest xray (793 2) (R93 8)   2  Adhesive bursitis of left shoulder (726 0) (M75 02)   3  Anemia of chronic disease (285 29) (D63 8)   4  Benign essential hypertension (401 1) (I10)   5  Bladder cancer (188 9) (C67 9)   6  Cervical spinal stenosis (723 0) (M48 02)   7  Chronic kidney disease, stage 4 (severe) (585 4) (N18 4)   8  Colon cancer screening (V76 51) (Z12 11)   9  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 3)   10  Constipation (564 00) (K59 00)   11  Cough (786 2) (R05)   12  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)   13  Diabetic nephropathy (250 40,583 81) (E11 21)   14  Diastolic dysfunction (596 4) (I51 9)   15  DM type 2 (diabetes mellitus, type 2) (250 00) (E11 9)   16  Dyslipidemia (272 4) (E78 5)   17  Encounter for screening colonoscopy (V76 51) (Z12 11)   18  Fever (780 60) (R50 9)   19  Hypomagnesemia (275 2) (E83 42)   20  Knee pain, left (719 46) (M25 562)   21  Limb pain (729 5) (M79 609)   22  Lung mass (786 6) (R91 8)   23  Lung nodule, solitary (793 11) (R91 1)   24  Mass of lower lobe of right lung (786 6) (R91 8)   25  Metabolic acidosis (018 9) (E87 2)   26  Microscopic hematuria (599 72) (R31 2)   27  Neck Strain (847 0)   28  Need for prophylactic vaccination and inoculation against influenza (V04 81) (Z23)   29  Need for Tdap vaccination (V06 1) (Z23)   30  Need for vaccination with 13-polyvalent pneumococcal conjugate vaccine (V03 82) (Z23)   31  Need for Zostavax administration (V04 89) (Z23)   32   Non compliance w medication regimen (V15 81) (Z91 14)   33  Other abnormal finding of urine (791 9) (R82 99)   34  Peripheral neuropathy (356 9) (G62 9)   35  Peripheral vascular disease (443 9) (I73 9)   36  Proteinuria (791 0) (R80 9)   37  Right knee pain (719 46) (M25 561)   38  Seborrheic keratosis (702 19) (L82 1)   39  Shoulder joint pain, unspecified laterality   40  Urinary tract infection (599 0) (N39 0)   41  Vitamin D deficiency (268 9) (E55 9)    Current Meds   1  FreeStyle Lancets Miscellaneous; TESTING THREE TIMES A DAY; Therapy: 92EUC9139 to (Evaluate:22Jun2016)  Requested for: 27DJQ1334; Last   Rx:71Zzu0068 Ordered   2  FreeStyle Lite Device; Test blood sugar three times daily; Therapy: 16QSL4680 to (Evaluate:18Feb2016)  Requested for: 72UQI2357; Last   Rx:27Lvc6241 Ordered   3  FreeStyle Lite Test In Vitro Strip; TEST 3 TIMES DAILY; Therapy: 05CSM4450 to (Evaluate:78Gqy4096)  Requested for: 26XFZ2617; Last   Rx:06Tpa0281 Ordered   4  Levemir FlexTouch 100 UNIT/ML Subcutaneous Solution Pen-injector; INJECT 50 UNIT   Bedtime; Therapy: 16RZT9553 to (Last Rx:60Jvc0452)  Requested for: 38CEQ3404 Ordered   5  Lisinopril 2 5 MG Oral Tablet; TAKE 1 TABLET DAILY AS DIRECTED; Therapy: 94IVQ7259 to (Freddy So)  Requested for: 52AQW9721; Last   Rx:73Tkr1808 Ordered   6  Pen Needles 31G X 6 MM Miscellaneous; USE AS DIRECTED; Therapy: 52RNE3950 to (Evaluate:11Pfy4958)  Requested for: 21Jan2016; Last   Rx:21Jan2016 Ordered   7  Pravastatin Sodium 20 MG Oral Tablet; TAKE 1 TABLET DAILY; Therapy: 14OQS7425 to (Freddy So)  Requested for: 01Ofx0631; Last   Rx:98Xam8042 Ordered   8  Vitamin D3 87303 UNIT Oral Capsule; take 1 capsule weekly for 8 weeks; Therapy: 01KDL9329 to (Evaluate:08Jun2016)  Requested for: 03UZO6926; Last   Rx:77Ndr7138 Ordered    Allergies    1   Aspir-81 TBEC    Signatures   Electronically signed by : FEDERICA Calhoun ; Mar 18 2016  2:14PM EST

## 2018-01-10 NOTE — RESULT NOTES
Verified Results  (1) LIPID PANEL, FASTING 10Aug2016 08:55AM Homero Fu Order Number: [id number]     Test Name Result Flag Reference   CHOLESTEROL 123 mg/dL     HDL,DIRECT 31 mg/dL L 40-60   Specimen collection should occur prior to Metamizole administration due to the potential for falsely depressed results  LDL CHOLESTEROL CALCULATED 51 mg/dL  0-100   - Patient Instructions: This is a fasting blood test  Water,black tea or black  coffee only after 9:00pm the night before test   Drink 2 glasses of water the morning of test     - Patient Instructions: This is a fasting blood test  Water,black tea or black  coffee only after 9:00pm the night before test Drink 2 glasses of water the morning of test   Triglyceride:         Normal              <150 mg/dl       Borderline High    150-199 mg/dl       High               200-499 mg/dl       Very High          >499 mg/dl  Cholesterol:         Desirable        <200 mg/dl      Borderline High  200-239 mg/dl      High             >239 mg/dl  HDL Cholesterol:        High    >59 mg/dL      Low     <41 mg/dL  LDL CALCULATED:    This screening LDL is a calculated result  It does not have the accuracy of the Direct Measured LDL in the monitoring of patients with hyperlipidemia and/or statin therapy  Direct Measure LDL (ZNO629) must be ordered separately in these patients  TRIGLYCERIDES 204 mg/dL H <=150   Specimen collection should occur prior to N-Acetylcysteine or Metamizole administration due to the potential for falsely depressed results  Plan  DM type 2 (diabetes mellitus, type 2), Dyslipidemia    · (1) LIPID PANEL, FASTING ; every 1 year;  Last 19IPO7658; Next 10Aug2017;  Status:Active

## 2018-01-11 NOTE — MISCELLANEOUS
Message   Recorded as Task   Date: 09/01/2016 03:47 PM, Created By: Oddis Simmonds   Task Name: Miscellaneous   Assigned To: Angie Montilla   Regarding Patient: Ade Culver, Status: In Progress   Navya Wyman - 01 Sep 2016 3:47 PM     TASK CREATED  bmp in 4 weeks   Anamaria Islas - 01 Sep 2016 4:28 PM     TASK REASSIGNED: Previously Assigned To Kanslerinrinne 45 - 02 Sep 2016 10:40 AM     TASK IN PROGRESS   Called and spoke with patient  He is aware of the BMP to be done in 4 weeks  I will be mailing out the lab script in the mail  Active Problems    1  Adhesive bursitis of left shoulder (726 0) (M75 02)   2  Anemia of chronic disease (285 29) (D63 8)   3  Benign essential hypertension (401 1) (I10)   4  Bladder cancer (188 9) (C67 9)   5  Cervical spinal stenosis (723 0) (M48 02)   6  Chronic kidney disease, stage 4 (severe) (585 4) (N18 4)   7  Colon cancer screening (V76 51) (Z12 11)   8  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 3)   9  Constipation (564 00) (K59 00)   10  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)   11  Diabetic nephropathy (250 40,583 81) (E11 21)   12  Diastolic dysfunction (171 7) (I51 9)   13  DM type 2 (diabetes mellitus, type 2) (250 00) (E11 9)   14  Dyslipidemia (272 4) (E78 5)   15  Encounter for screening colonoscopy (V76 51) (Z12 11)   16  Hypomagnesemia (275 2) (E83 42)   17  Leg skin lesion, right (709 9) (L98 9)   18  Metabolic acidosis (092 3) (E87 2)   19  Microscopic hematuria (599 72) (R31 2)   20  Need for prophylactic vaccination and inoculation against influenza (V04 81) (Z23)   21  Need for Tdap vaccination (V06 1) (Z23)   22  Need for vaccination with 13-polyvalent pneumococcal conjugate vaccine (V03 82) (Z23)   23  Need for Zostavax administration (V04 89) (Z23)   24  Non compliance w medication regimen (V15 81) (Z91 14)   25  Peripheral neuropathy (356 9) (G62 9)   26  Peripheral vascular disease (443 9) (I73 9)   27  Seborrheic keratosis (702 19) (L82 1)   28  Secondary hyperparathyroidism of renal origin (588 81) (N25 81)   29  Vitamin D deficiency (268 9) (E55 9)    Current Meds   1  Aspirin 81 MG Oral Tablet Delayed Release; take 1 tablet every day; Therapy: 31WMV5649 to (Evaluate:62Cop7963)  Requested for: 06Tsv7054; Last   Rx:37Fvp2438 Ordered   2  FreeStyle Lancets Miscellaneous; TESTING THREE TIMES A DAY; Therapy: 14ESR5346 to (Evaluate:78Yht9402)  Requested for: 41Lyn8183; Last   Rx:86Ect4408 Ordered   3  FreeStyle Lite Device; Test blood sugar three times daily; Therapy: 51BDW3090 to (Verlean Prom)  Requested for: 42RBF4505; Last   Rx:90Cdz3172 Ordered   4  FreeStyle Lite Test In Vitro Strip; TEST BLOOS SUGAR 3 TIMES DAILY; Therapy: 24FHB7995 to (Evaluate:11May2017)  Requested for: 16NBV1128; Last   Rx:16May2016 Ordered   5  HumaLOG KwikPen 100 UNIT/ML Subcutaneous Solution Pen-injector; INJECT   SUBCUTANEOUSLY AS DIRECTED  TDD:30 units; Therapy: 48Qlb7753 to (Last Jessica Bold)  Requested for: 67Ebl5703; Status:   ACTIVE - Transmit to Pharmacy - Awaiting Verification Ordered   6  Lantus SoloStar 100 UNIT/ML Subcutaneous Solution Pen-injector; INJECT 60 UNIT   Daily TDD:60 unit; Therapy: 01Mlc5310 to (Evaluate:09Frw0878)  Requested for: 88Odz9826; Last   Rx:32Mfm8873; Status[facility] [address] [facility] [place]   Ordered   7  Lisinopril 2 5 MG Oral Tablet; TAKE ONE TABLET BY MOUTH ONCE DAILY AS   DIRECTED; Therapy: 23UFJ9126 to (Evaluate:20May2017)  Requested for: 41KIE9926; Last   Rx:76Jgd6925 Ordered   8  Pen Needles 31G X 6 MM Miscellaneous; USE AS DIRECTED; Therapy: 51CDD0469 to (Evaluate:21Mar2017)  Requested for: 61Ywr7671; Last   Rx:86Bze8882; Status: ACTIVE - Transmit to [facility] Verification Ordered   9  Pravastatin Sodium 20 MG Oral Tablet; Take one tablet daily;    Therapy: 00UAZ7467 to (Evaluate:20May2017)  Requested for: 09YAM4228; Last   Rx:25May2016 Ordered   10  Sodium Bicarbonate 650 MG Oral Tablet; TAKE 1 TABLET 3 TIMES DAILY WITH MEALS; Therapy: 66RVM4413 to (Evaluate:19Wqw3592)  Requested for: 01Sep2016 Recorded   11  Vitamin D3 80098 UNIT Oral Capsule; take 1 capsule weekly for 8 weeks; Therapy: 64BDD5769 to (Evaluate:08Jun2016)  Requested for: 06ZUD1821; Last    Rx:96Iir9561 Ordered    Allergies    1  Aspir-81 TBEC    Plan  Benign essential hypertension, Chronic kidney disease, stage 4 (severe)    · (1) BASIC METABOLIC PROFILE; Status:Active;  Requested for:01Oct2016;     Signatures   Electronically signed by : FEDERICA Barnett ; Sep  2 2016 11:53AM EST

## 2018-01-11 NOTE — MISCELLANEOUS
Message   Recorded as Task   Date: 08/26/2016 04:13 PM, Created By: Tatyana Ryan   Task Name: Miscellaneous   Assigned To: Melody Morin   Regarding Patient: Ailin Arce, Status: In Progress   Tarajose angel Reza - 26 Aug 2016 4:13 PM     TASK CREATED  need labs since April  Also need last urology andres't letters   Melody Morin - 29 Aug 2016 8:39 AM     TASK REASSIGNED: Previously Assigned To [facility] Alpesh Hernandez - 29 Aug 2016 10:13 AM     TASK IN PROGRESS   Lab slips were mailed to pt on 8/19/2016, will be going for them this week, [facility] will be faxing over last appt letter  [facility] 8/29/2016      Active Problems    1  Adhesive bursitis of left shoulder (726 0) (M75 02)   2  Anemia of chronic disease (285 29) (D63 8)   3  Benign essential hypertension (401 1) (I10)   4  Bladder cancer (188 9) (C67 9)   5  Cervical spinal stenosis (723 0) (M48 02)   6  Chronic kidney disease, stage 4 (severe) (585 4) (N18 4)   7  Colon cancer screening (V76 51) (Z12 11)   8  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 3)   9  Constipation (564 00) (K59 00)   10  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)   11  Diabetic nephropathy (250 40,583 81) (E11 21)   12  Diastolic dysfunction (326 5) (I51 9)   13  DM type 2 (diabetes mellitus, type 2) (250 00) (E11 9)   14  Dyslipidemia (272 4) (E78 5)   15  Encounter for screening colonoscopy (V76 51) (Z12 11)   16  Hypomagnesemia (275 2) (E83 42)   17  Leg skin lesion, right (709 9) (L98 9)   18  Metabolic acidosis (441 2) (E87 2)   19  Microscopic hematuria (599 72) (R31 2)   20  Need for prophylactic vaccination and inoculation against influenza (V04 81) (Z23)   21  Need for Tdap vaccination (V06 1) (Z23)   22  Need for vaccination with 13-polyvalent pneumococcal conjugate vaccine (V03 82) (Z23)   23  Need for Zostavax administration (V04 89) (Z23)   24  Non compliance w medication regimen (V15 81) (Z91 14)   25   Peripheral neuropathy (356 9) (G62 9)   26  Peripheral vascular disease (443 9) (I73 9)   27  Seborrheic keratosis (702 19) (L82 1)   28  Vitamin D deficiency (268 9) (E55 9)    Current Meds   1  Aspirin 81 MG Oral Tablet Delayed Release; take 1 tablet every day; Therapy: 57QFS4568 to (Evaluate:06Jaj4015)  Requested for: 06Rty0165; Last   Rx:79Wmj3626 Ordered   2  FreeStyle Lancets Miscellaneous; TESTING THREE TIMES A DAY; Therapy: 59KWK9824 to (Evaluate:16Ydo8820)  Requested for: 46Bsl9955; Last   Rx:09Aug2016 Ordered   3  FreeStyle Lite Device; Test blood sugar three times daily; Therapy: 88JQQ5306 to (Kandace Roman)  Requested for: 74EUI7441; Last   Rx:25May2016 Ordered   4  FreeStyle Lite Test In Vitro Strip; TEST BLOOS SUGAR 3 TIMES DAILY; Therapy: 88HMN1944 to (Evaluate:11May2017)  Requested for: 22IUW3668; Last   Rx:16May2016 Ordered   5  HumaLOG KwikPen 100 UNIT/ML Subcutaneous Solution Pen-injector; INJECT   SUBCUTANEOUSLY AS DIRECTED  TDD:30 units; Therapy: 88Hkq4491 to (Last Georganna Gamma)  Requested for: 98Cmy6459; Status:   ACTIVE - Transmit to Pharmacy - Awaiting Verification Ordered   6  Lantus SoloStar 100 UNIT/ML Subcutaneous Solution Pen-injector; INJECT 60 UNIT   Daily TDD:60 unit; Therapy: 96Yig9727 to (Evaluate:10Cfq8389)  Requested for: 65Sdz0522; Last   Rx:52Jcr7193; Status: [address] [place] Verification   Ordered   7  Lisinopril 2 5 MG Oral Tablet; TAKE ONE TABLET BY MOUTH ONCE DAILY AS   DIRECTED; Therapy: 82INJ1263 to (Evaluate:20May2017)  Requested for: 62DLV9989; Last   Rx:25May2016 Ordered   8  Pen Needles 31G X 6 MM Miscellaneous; USE AS DIRECTED; Therapy: 07EOZ2660 to (Evaluate:21Mar2017)  Requested for: 68Iyz2370; Last   Rx:28Wio0964; Status: ACTIVE - Transmit to [facility] Verification Ordered   9  Pravastatin Sodium 20 MG Oral Tablet; Take one tablet daily;    Therapy: 77LVQ2651 to (Evaluate:20May2017)  Requested for: 67DNV2224; Last   Rx:67Bih5211 Ordered 10  Sodium Bicarbonate 650 MG Oral Tablet; Take 1 tablet twice daily; Therapy: 24AWR8067 to (Evaluate:85Bwk5238)  Requested for: 78VVW0068; Last    Rx:18Mar2016 Ordered   11  Vitamin D3 28195 UNIT Oral Capsule; take 1 capsule weekly for 8 weeks; Therapy: 78IFJ9831 to (Evaluate:08Jun2016)  Requested for: 03OAB2864; Last    Rx:64Cbv0617 Ordered    Allergies    1   Aspir-81 TBEC    Signatures   Electronically signed by : FEDERICA Rome ; Aug 29 2016  1:05PM EST

## 2018-01-11 NOTE — RESULT NOTES
Message  bicarb 17 down from 20  Will increase Na bicarb to 1300mg thrice daily  Monitor bicarb  Verified Results  (1) VITAMIN D 25-HYDROXY 70Ywb5424 09:20AM Lesvia Mora     Test Name Result Flag Reference   VIT D 25-HYDROX 32 7 ng/mL  30 0-100 0   This assay is a certified procedure of the CDC Vitamin D Standardization Certification Program (VDSCP)     Deficiency <20ng/ml   Insufficiency 20-30ng/ml   Sufficient  ng/ml     *Patients undergoing fluorescein dye angiography may retain small amounts of fluorescein in the body for 48-72 hours post procedure  Samples containing fluorescein can produce falsely elevated Vitamin D values  If the patient had this procedure, a specimen should be resubmitted post fluorescein clearance         Plan  Chronic kidney disease, stage 4 (severe)    · Sodium Bicarbonate 650 MG Oral Tablet; TAKE 2 TABLET 3 times daily    Signatures   Electronically signed by : Storm Armendariz DO; Oct 18 2017  6:07PM EST                       (Author)

## 2018-01-13 VITALS
SYSTOLIC BLOOD PRESSURE: 130 MMHG | WEIGHT: 179.5 LBS | HEIGHT: 69 IN | DIASTOLIC BLOOD PRESSURE: 62 MMHG | HEART RATE: 70 BPM | BODY MASS INDEX: 26.59 KG/M2

## 2018-01-13 VITALS
HEART RATE: 76 BPM | HEIGHT: 69 IN | DIASTOLIC BLOOD PRESSURE: 68 MMHG | WEIGHT: 179.9 LBS | SYSTOLIC BLOOD PRESSURE: 104 MMHG | BODY MASS INDEX: 26.64 KG/M2 | TEMPERATURE: 97.7 F

## 2018-01-13 VITALS
HEIGHT: 60 IN | DIASTOLIC BLOOD PRESSURE: 70 MMHG | BODY MASS INDEX: 34.71 KG/M2 | WEIGHT: 176.81 LBS | TEMPERATURE: 98.3 F | HEART RATE: 68 BPM | SYSTOLIC BLOOD PRESSURE: 120 MMHG

## 2018-01-14 VITALS
HEIGHT: 69 IN | WEIGHT: 177.69 LBS | BODY MASS INDEX: 26.32 KG/M2 | HEART RATE: 72 BPM | DIASTOLIC BLOOD PRESSURE: 70 MMHG | TEMPERATURE: 97.7 F | SYSTOLIC BLOOD PRESSURE: 106 MMHG

## 2018-01-14 VITALS
HEART RATE: 68 BPM | WEIGHT: 183.42 LBS | BODY MASS INDEX: 27.17 KG/M2 | SYSTOLIC BLOOD PRESSURE: 126 MMHG | TEMPERATURE: 97.7 F | DIASTOLIC BLOOD PRESSURE: 80 MMHG | HEIGHT: 69 IN

## 2018-01-14 VITALS
WEIGHT: 182.1 LBS | HEART RATE: 80 BPM | TEMPERATURE: 98.2 F | SYSTOLIC BLOOD PRESSURE: 134 MMHG | BODY MASS INDEX: 26.97 KG/M2 | DIASTOLIC BLOOD PRESSURE: 76 MMHG | HEIGHT: 69 IN

## 2018-01-14 VITALS
WEIGHT: 184.25 LBS | BODY MASS INDEX: 27.29 KG/M2 | DIASTOLIC BLOOD PRESSURE: 80 MMHG | HEIGHT: 69 IN | SYSTOLIC BLOOD PRESSURE: 138 MMHG

## 2018-01-14 VITALS
HEIGHT: 69 IN | WEIGHT: 178.13 LBS | HEART RATE: 70 BPM | DIASTOLIC BLOOD PRESSURE: 78 MMHG | BODY MASS INDEX: 26.38 KG/M2 | SYSTOLIC BLOOD PRESSURE: 132 MMHG

## 2018-01-14 NOTE — MISCELLANEOUS
Message   Recorded as Task   Date: 2016 01:46 PM, Created By: Aracely Adorno   Task Name: Miscellaneous   Assigned To: [facility],Team   Regarding Patient: Nate Beltran, Status: In Progress   Comment:    Sommer Garcia - 17 Mar 2016 1:46 PM     TASK CREATED  Patient's bicarb was a little low on recent labs  Have him start sodium bicarb tabs 650mg bid and repeat BMP in 3 weeks prior to visit with Dr Manuela Apgar - 18 Mar 2016 1:04 PM     TASK IN [employer] - 18 Mar 2016 1:04 PM     TASK EDITED  Called and spoke with patient  Sending him lab script in mail and sending rx to pharmacy  Active Problems    1  Abnormal finding on chest xray (793 2) (R93 8)   2  Adhesive bursitis of left shoulder (726 0) (M75 02)   3  Anemia of chronic disease (285 29) (D63 8)   4  Benign essential hypertension (401 1) (I10)   5  Bladder cancer (188 9) (C67 9)   6  Cervical spinal stenosis (723 0) (M48 02)   7  Chronic kidney disease, stage 4 (severe) (585 4) (N18 4)   8  Colon cancer screening (V76 51) (Z12 11)   9  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 3)   10  Constipation (564 00) (K59 00)   11  Cough (786 2) (R05)   12  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)   13  Diabetic nephropathy (250 40,583 81) (E11 21)   14  Diastolic dysfunction (222 5) (I51 9)   15  DM type 2 (diabetes mellitus, type 2) (250 00) (E11 9)   16  Dyslipidemia (272 4) (E78 5)   17  Encounter for screening colonoscopy (V76 51) (Z12 11)   18  Fever (780 60) (R50 9)   19  Hypomagnesemia (275 2) (E83 42)   20  Knee pain, left (719 46) (M25 562)   21  Limb pain (729 5) (M79 609)   22  Lung mass (786 6) (R91 8)   23  Lung nodule, solitary (793 11) (R91 1)   24  Mass of lower lobe of right lung (786 6) (R91 8)   25  Metabolic acidosis (053 8) (E87 2)   26  Microscopic hematuria (599 72) (R31 2)   27  Neck Strain (847 0)   28   Need for prophylactic vaccination and inoculation against influenza (V04 81) (Z23) 29  Need for Tdap vaccination (V06 1) (Z23)   30  Need for vaccination with 13-polyvalent pneumococcal conjugate vaccine (V03 82) (Z23)   31  Need for Zostavax administration (V04 89) (Z23)   32  Non compliance w medication regimen (V15 81) (Z91 14)   33  Other abnormal finding of urine (791 9) (R82 99)   34  Peripheral neuropathy (356 9) (G62 9)   35  Peripheral vascular disease (443 9) (I73 9)   36  Proteinuria (791 0) (R80 9)   37  Right knee pain (719 46) (M25 561)   38  Seborrheic keratosis (702 19) (L82 1)   39  Shoulder joint pain, unspecified laterality   40  Urinary tract infection (599 0) (N39 0)   41  Vitamin D deficiency (268 9) (E55 9)    Current Meds   1  FreeStyle Lancets Miscellaneous; TESTING THREE TIMES A DAY; Therapy: 15SXG1442 to (Evaluate:22Jun2016)  Requested for: 37EHP3149; Last   Rx:36Isl2437 Ordered   2  FreeStyle Lite Device; Test blood sugar three times daily; Therapy: 07PNB0044 to (Evaluate:55Nol8901)  Requested for: 31YZC4313; Last   Rx:70Ebt2055 Ordered   3  FreeStyle Lite Test In Vitro Strip; TEST 3 TIMES DAILY; Therapy: 09YDL2833 to (Evaluate:74Wvy0864)  Requested for: 36NJA1406; Last   Rx:14Edx4707 Ordered   4  Levemir FlexTouch 100 UNIT/ML Subcutaneous Solution Pen-injector; INJECT 50 UNIT   Bedtime; Therapy: 85MAU8959 to (Last Rx:77Mlj4595)  Requested for: 39QZE3788 Ordered   5  Lisinopril 2 5 MG Oral Tablet; TAKE 1 TABLET DAILY AS DIRECTED; Therapy: 29OTL2430 to ([facility])  Requested for: 80ZKO5621; Last   Rx:48Bco2297 Ordered   6  Pen Needles 31G X 6 MM Miscellaneous; USE AS DIRECTED; Therapy: 46PMJ9405 to (Evaluate:97Hhn1213)  Requested for: 21Jan2016; Last   Rx:21Jan2016 Ordered   7  Pravastatin Sodium 20 MG Oral Tablet; TAKE 1 TABLET DAILY; Therapy: 06NUN8208 to ([facility])  Requested for: 09Sep2015; Last   Rx:09Sep2015 Ordered   8  Vitamin D3 75408 UNIT Oral Capsule; take 1 capsule weekly for 8 weeks;    Therapy: 12KLL0431 to (Evaluate:69Kdg4223)  Requested for: 13PGU0419; Last   Rx:08Xpz2940 Ordered    Allergies    1   Aspir-81 TBEC    Signatures   Electronically signed by : FEDERICA Craig ; Mar 18 2016  2:14PM EST

## 2018-01-14 NOTE — MISCELLANEOUS
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 3)   9  Constipation (564 00) (K59 00)   10  Cough (786 2) (R05)   11  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)   12  Diabetic nephropathy (250 40,583 81) (E11 21)   13  Diastolic dysfunction (114 3) (I51 9)   14  DM type 2 (diabetes mellitus, type 2) (250 00) (E11 9)   15  Dyslipidemia (272 4) (E78 5)   16  Encounter for screening colonoscopy (V76 51) (Z12 11)   17  Fever (780 60) (R50 9)   18  Hypomagnesemia (275 2) (E83 42)   19  Knee pain, left (719 46) (M25 562)   20  Limb pain (729 5) (M79 609)   21  Lung mass (786 6) (R91 8)   22  Lung nodule, solitary (793 11) (R91 1)   23  Mass of lower lobe of right lung (786 6) (J98 4)   24  Metabolic acidosis (922 1) (E87 2)   25  Microscopic hematuria (599 72) (R31 2)   26  Neck Strain (847 0)   27  Need for prophylactic vaccination and inoculation against influenza (V04 81) (Z23)   28  Need for Tdap vaccination (V06 1) (Z23)   29  Need for vaccination with 13-polyvalent pneumococcal conjugate vaccine (V03 82) (Z23)   30  Need for Zostavax administration (V04 89) (Z23)   31  Non compliance w medication regimen (V15 81) (Z91 14)   32  Other abnormal finding of urine (791 9) (R82 99)   33  Peripheral neuropathy (356 9) (G62 9)   34  Peripheral vascular disease (443 9) (I73 9)   35  Proteinuria (791 0) (R80 9)   36  Right knee pain (719 46) (M25 561)   37  Seborrheic keratosis (702 19) (L82 1)   38  Shoulder joint pain, unspecified laterality (719 41) (M25 519)   39  Urinary tract infection (599 0) (N39 0)   40  Vitamin D deficiency (268 9) (E55 9)    Current Meds   1  FreeStyle Lancets Miscellaneous; TESTING THREE TIMES A DAY; Therapy: 82KOA0185 to (Evaluate:22Jun2016)  Requested for: 03OUP6954; Last   Rx:68Edb5943 Ordered   2  FreeStyle Lite Device; Test blood sugar three times daily; Therapy: 51GSL9854 to (Evaluate:18Feb2016)  Requested for: 01QVF5132; Last   Rx:67Gox6083 Ordered   3  FreeStyle Lite Test In Vitro Strip; TEST 3 TIMES DAILY; Therapy: 38XLY4511 to (Evaluate:46Pvh7728)  Requested for: 77DHU9382; Last   Rx:99Rpt8479 Ordered   4  Levemir FlexTouch 100 UNIT/ML Subcutaneous Solution Pen-injector; INJECT 50 UNIT   Bedtime; Therapy: 08PLQ5114 to (Last Rx:17Feb2016)  Requested for: 18IKM4920 Ordered   5  Lisinopril 2 5 MG Oral Tablet; TAKE 1 TABLET DAILY AS DIRECTED; Therapy: 97PPG5399 to ([facility])  Requested for: 79OPH0848; Last   Rx:54Gbz1718 Ordered   6  Pen Needles 31G X 6 MM Miscellaneous; USE AS DIRECTED; Therapy: 12IWS2530 to (Evaluate:26Mgy5960)  Requested for: 21Jan2016; Last   Rx:21Jan2016 Ordered   7  Pravastatin Sodium 20 MG Oral Tablet; TAKE 1 TABLET DAILY; Therapy: 40RVP5095 to ([facility])  Requested for: 47Oxt7514; Last   Rx:47Xuy4960 Ordered   8  Vitamin D3 84248 UNIT Oral Capsule; take 1 capsule weekly for 8 weeks; Therapy: 91KOU6660 to (Evaluate:08Jun2016)  Requested for: 87FHW4052; Last   Rx:64Kqu7615 Ordered    Allergies    1   Aspir-81 TBEC    Signatures   Electronically signed by : Daniel Rodriguez LCSW; Feb 18 2016  1:19PM EST                       (Author)

## 2018-01-17 NOTE — MISCELLANEOUS
Provider Comments  Provider Comments:   PATIENT WAS A NO SHOW FOR THE APPOINTMENT      Signatures   Electronically signed by : FEDERICA Ruiz ; Apr 13 2016  8:36AM EST

## 2018-01-17 NOTE — MISCELLANEOUS
Provider Comments  Provider Comments:   PATIIENT WAS A NO SHOW FOR THE APPOINTMENT      Signatures   Electronically signed by : Nixon Drew DO; May  1 2017  4:26PM EST                       (Author)

## 2018-01-22 VITALS
HEIGHT: 69 IN | DIASTOLIC BLOOD PRESSURE: 78 MMHG | TEMPERATURE: 98.1 F | BODY MASS INDEX: 27.49 KG/M2 | SYSTOLIC BLOOD PRESSURE: 100 MMHG | WEIGHT: 185.63 LBS | HEART RATE: 72 BPM

## 2018-01-23 PROBLEM — D63.8 ANEMIA OF CHRONIC DISORDER: Status: ACTIVE | Noted: 2018-01-23

## 2018-01-23 PROBLEM — E11.9 DM TYPE 2 (DIABETES MELLITUS, TYPE 2) (HCC): Status: ACTIVE | Noted: 2018-01-23

## 2018-01-23 PROBLEM — E55.9 VITAMIN D DEFICIENCY: Status: ACTIVE | Noted: 2018-01-23

## 2018-01-23 PROBLEM — E78.5 DYSLIPIDEMIA: Status: ACTIVE | Noted: 2018-01-23

## 2018-01-23 PROBLEM — N18.4 CHRONIC KIDNEY DISEASE, STAGE IV (SEVERE) (HCC): Status: ACTIVE | Noted: 2018-01-23

## 2018-01-23 PROBLEM — I51.89 DIASTOLIC DYSFUNCTION: Status: ACTIVE | Noted: 2018-01-23

## 2018-01-23 NOTE — MISCELLANEOUS
Provider Comments  Provider Comments:   pt was a no show for todays appointment ls18      Signatures   Electronically signed by : Mae Sosa DO; Dec  1 2017  4:28PM EST                       (Author)

## 2018-01-25 ENCOUNTER — OFFICE VISIT (OUTPATIENT)
Dept: INTERNAL MEDICINE CLINIC | Facility: CLINIC | Age: 83
End: 2018-01-25
Payer: COMMERCIAL

## 2018-01-25 VITALS
DIASTOLIC BLOOD PRESSURE: 72 MMHG | HEIGHT: 69 IN | BODY MASS INDEX: 24.69 KG/M2 | HEART RATE: 76 BPM | TEMPERATURE: 97.8 F | SYSTOLIC BLOOD PRESSURE: 134 MMHG | WEIGHT: 166.67 LBS

## 2018-01-25 DIAGNOSIS — E78.5 DYSLIPIDEMIA: ICD-10-CM

## 2018-01-25 DIAGNOSIS — R63.4 WEIGHT LOSS: ICD-10-CM

## 2018-01-25 DIAGNOSIS — E11.8 TYPE 2 DIABETES MELLITUS WITH COMPLICATION, UNSPECIFIED LONG TERM INSULIN USE STATUS: Primary | ICD-10-CM

## 2018-01-25 DIAGNOSIS — I10 ESSENTIAL HYPERTENSION: ICD-10-CM

## 2018-01-25 DIAGNOSIS — N18.4 CHRONIC KIDNEY DISEASE, STAGE IV (SEVERE) (HCC): ICD-10-CM

## 2018-01-25 DIAGNOSIS — R53.83 OTHER FATIGUE: ICD-10-CM

## 2018-01-25 DIAGNOSIS — Z23 NEED FOR INFLUENZA VACCINATION: ICD-10-CM

## 2018-01-25 LAB — SL AMB POCT HEMOGLOBIN AIC: 8.7

## 2018-01-25 PROCEDURE — 83037 HB GLYCOSYLATED A1C HOME DEV: CPT | Performed by: INTERNAL MEDICINE

## 2018-01-25 PROCEDURE — 99214 OFFICE O/P EST MOD 30 MIN: CPT | Performed by: INTERNAL MEDICINE

## 2018-01-25 PROCEDURE — 3045F PR MOST RECENT HEMOGLOBIN A1C LEVEL 7.0-9.0%: CPT | Performed by: INTERNAL MEDICINE

## 2018-01-25 RX ORDER — LANCETS 28 GAUGE
EACH MISCELLANEOUS
COMMUNITY
Start: 2015-03-24 | End: 2018-11-05 | Stop reason: SDUPTHER

## 2018-01-25 RX ORDER — BLOOD-GLUCOSE METER
KIT MISCELLANEOUS 3 TIMES DAILY
COMMUNITY
Start: 2015-03-24

## 2018-01-25 RX ORDER — SODIUM BICARBONATE 650 MG/1
2 TABLET ORAL 3 TIMES DAILY
COMMUNITY
Start: 2017-09-21 | End: 2018-07-27 | Stop reason: SDUPTHER

## 2018-01-25 NOTE — PROGRESS NOTES
ASSESSMENT/PLAN:  Diagnoses and all orders for this visit:    Type 2 diabetes mellitus with complication, unspecified long term insulin use status (HCC)  -     POCT hemoglobin A1c    Need for influenza vaccination  -     Flu vaccine greater than or equal to 4yo preservative free IM    Essential hypertension    Chronic kidney disease, stage IV (severe) (HCC)    Dyslipidemia    Plan:  Fatigue/weight loss- Unclear etiology, does have very decreased appetite  Will check CMP, CBC, TSH and UA  Suspect stress from sister being sick as contributing  Will screen for depression at next visit  DM II- A1c 8 7% today  Acceptable range for his age and given hx of hypoglycemia  Saw podiatry in August 2017  Micro:Cr done 9/17  Foot exam done today 1/25  Pt will be making andres with ophthalmologist dr Nelly Kerns  Continue ASA and statin    HTN: Well-controlled  Continue low dose Lisinopril  HL: Continue pravastatin  Lipids done 9/17  CKD IV: Follows with Nephro, has appt with them in February  Will order basic labs  Hx bladder cancer: S/p cystoprostatectomy, chronic hydronephrosis in 2003  Saw urology recently  G1 Diastolic CHF: Per ECHO 7613  Stable, asx  Health Maintenance:  Td done 8/16  PCV, PPSV done 1425-4837  The patient does not want Zoster shot at this time  Follow-up:  RTC 3 months      CHIEF COMPLAINT: Follow-up    HISTORY OF PRESENT ILLNESS:  The patient is here for follow-up of his chronic conditions  He was in [place] recently visiting his sister who is sick  He caught a bad cold there and still has a lingering cough  The patient has lost weight recently  He lost 10 lb since Nov and 20 lb since September  He still feels week/fatigued  He is not eating well lately, decreased appetite and he only occasionally get stomach pain when he eats  He has been under a lot of stress lately since his sister is sick  He has not seen Dr Nelly Kerns of ophthalmology this year but will make an appt   He is also due to see Nephrology  He has an appt for Feb 14th  Review of Systems   Constitutional: Positive for appetite change and unexpected weight change  Negative for activity change, chills and diaphoresis  Respiratory: Positive for cough  Negative for shortness of breath  Cardiovascular: Negative for chest pain  Gastrointestinal: Negative for abdominal pain, blood in stool, diarrhea, nausea and vomiting  OBJECTIVE:  Vitals:    01/25/18 1408   BP: 134/72   BP Location: Left arm   Patient Position: Sitting   Cuff Size: Adult   Pulse: 76   Temp: 97 8 °F (36 6 °C)   TempSrc: Oral   Weight: 75 6 kg (166 lb 10 7 oz)   Height: 5' 9" (1 753 m)     Physical Exam  GEN: AAOx3, NAD, +thin-appearing  HEENT: PEERLA, EOMI, MM moist  No scleral icterus  CV: RRR, nml S1/S2, no murmur appreciated  Lungs: CTA bilaterally, no w/r/r  Skin: Erythematous, scaly rash on right foot    Psych: Normal mood and affect      Current Outpatient Prescriptions:     albuterol (5 mg/mL) 0 5 % nebulizer solution, Inhale, Disp: , Rfl:     aspirin 81 MG tablet, Take 1 tablet by mouth daily, Disp: , Rfl:     Blood Glucose Monitoring Suppl (FREESTYLE FREEDOM LITE) w/Device KIT, by Does not apply route 3 (three) times a day, Disp: , Rfl:     cholecalciferol (VITAMIN D3) 1,000 units tablet, Take 1 tablet by mouth daily, Disp: , Rfl:     glucose blood (FREESTYLE LITE) test strip, by In Vitro route 3 (three) times a day, Disp: , Rfl:     Insulin Glargine (TOUJEO SOLOSTAR) injection pen 300 units/mL, Inject under the skin, Disp: , Rfl:     insulin lispro (HUMALOG KWIKPEN) 100 Units/mL SOPN, Inject under the skin, Disp: , Rfl:     Lancets (FREESTYLE) lancets, by Does not apply route, Disp: , Rfl:     sodium bicarbonate 650 mg tablet, Take 2 tablets by mouth 3 (three) times a day, Disp: , Rfl:     Past Medical History:   Diagnosis Date    Cancer ([facility] [address] )     prostate    Diabetes mellitus ([facility] )     UTI (urinary tract infection)     RESOLVED 18 DEC 2012 Past Surgical History:   Procedure Laterality Date    APPENDECTOMY      BLADDER SURGERY      PROSTATE SURGERY       Social History     Social History    Marital status: /Civil Union     Spouse name: N/A    Number of children: N/A    Years of education: N/A     Occupational History    Not on file  Social History Main Topics    Smoking status: Never Smoker    Smokeless tobacco: Never Used      Comment: 2ND HAND SMOKE EXPOSURE    Alcohol use No      Comment: "Hx of social alcohol use"     Drug use: No    Sexual activity: Yes     Partners: Female     Birth control/ protection: None     Other Topics Concern    Not on file     Social History Narrative    No narrative on file     Family History   Problem Relation Age of Onset    Diabetes Mother      TYPE2    Diabetes Brother     Diabetes Family     Heart disease Family     Diabetes Daughter        No results found  Results Reviewed     None          DO Parrish Wiggins [facility] Internal Medicine PGY-2    [address], [address] [place] [address]  [phone]

## 2018-01-29 ENCOUNTER — TELEPHONE (OUTPATIENT)
Dept: INTERNAL MEDICINE CLINIC | Facility: CLINIC | Age: 83
End: 2018-01-29

## 2018-01-30 DIAGNOSIS — R05.9 COUGH: Primary | ICD-10-CM

## 2018-02-06 ENCOUNTER — APPOINTMENT (OUTPATIENT)
Dept: LAB | Facility: HOSPITAL | Age: 83
End: 2018-02-06
Payer: COMMERCIAL

## 2018-02-06 ENCOUNTER — TRANSCRIBE ORDERS (OUTPATIENT)
Dept: LAB | Facility: HOSPITAL | Age: 83
End: 2018-02-06

## 2018-02-06 DIAGNOSIS — R53.83 OTHER FATIGUE: ICD-10-CM

## 2018-02-06 LAB
ALBUMIN SERPL BCP-MCNC: 3.4 G/DL (ref 3.5–5)
ALP SERPL-CCNC: 162 U/L (ref 46–116)
ALT SERPL W P-5'-P-CCNC: 19 U/L (ref 12–78)
ANION GAP SERPL CALCULATED.3IONS-SCNC: 8 MMOL/L (ref 4–13)
AST SERPL W P-5'-P-CCNC: 12 U/L (ref 5–45)
BACTERIA UR QL AUTO: ABNORMAL /HPF
BASOPHILS # BLD AUTO: 0.06 THOUSANDS/ΜL (ref 0–0.1)
BASOPHILS NFR BLD AUTO: 1 % (ref 0–1)
BILIRUB SERPL-MCNC: 0.29 MG/DL (ref 0.2–1)
BILIRUB UR QL STRIP: NEGATIVE
BUN SERPL-MCNC: 42 MG/DL (ref 5–25)
CALCIUM SERPL-MCNC: 8.4 MG/DL (ref 8.3–10.1)
CHLORIDE SERPL-SCNC: 115 MMOL/L (ref 100–108)
CLARITY UR: ABNORMAL
CO2 SERPL-SCNC: 19 MMOL/L (ref 21–32)
COLOR UR: YELLOW
CREAT SERPL-MCNC: 2.65 MG/DL (ref 0.6–1.3)
EOSINOPHIL # BLD AUTO: 0.2 THOUSAND/ΜL (ref 0–0.61)
EOSINOPHIL NFR BLD AUTO: 3 % (ref 0–6)
ERYTHROCYTE [DISTWIDTH] IN BLOOD BY AUTOMATED COUNT: 14.9 % (ref 11.6–15.1)
GFR SERPL CREATININE-BSD FRML MDRD: 22 ML/MIN/1.73SQ M
GLUCOSE P FAST SERPL-MCNC: 170 MG/DL (ref 65–99)
GLUCOSE UR STRIP-MCNC: NEGATIVE MG/DL
HCT VFR BLD AUTO: 37.8 % (ref 36.5–49.3)
HGB BLD-MCNC: 12.9 G/DL (ref 12–17)
HGB UR QL STRIP.AUTO: ABNORMAL
HYALINE CASTS #/AREA URNS LPF: ABNORMAL /LPF
KETONES UR STRIP-MCNC: NEGATIVE MG/DL
LEUKOCYTE ESTERASE UR QL STRIP: ABNORMAL
LYMPHOCYTES # BLD AUTO: 2.48 THOUSANDS/ΜL (ref 0.6–4.47)
LYMPHOCYTES NFR BLD AUTO: 38 % (ref 14–44)
MCH RBC QN AUTO: 28.6 PG (ref 26.8–34.3)
MCHC RBC AUTO-ENTMCNC: 34.1 G/DL (ref 31.4–37.4)
MCV RBC AUTO: 84 FL (ref 82–98)
MONOCYTES # BLD AUTO: 0.5 THOUSAND/ΜL (ref 0.17–1.22)
MONOCYTES NFR BLD AUTO: 8 % (ref 4–12)
NEUTROPHILS # BLD AUTO: 3.26 THOUSANDS/ΜL (ref 1.85–7.62)
NEUTS SEG NFR BLD AUTO: 50 % (ref 43–75)
NITRITE UR QL STRIP: NEGATIVE
NON-SQ EPI CELLS URNS QL MICRO: ABNORMAL /HPF
NRBC BLD AUTO-RTO: 0 /100 WBCS
PH UR STRIP.AUTO: 7.5 [PH] (ref 4.5–8)
PLATELET # BLD AUTO: 238 THOUSANDS/UL (ref 149–390)
PMV BLD AUTO: 9.8 FL (ref 8.9–12.7)
POTASSIUM SERPL-SCNC: 3.6 MMOL/L (ref 3.5–5.3)
PROT SERPL-MCNC: 8.1 G/DL (ref 6.4–8.2)
PROT UR STRIP-MCNC: ABNORMAL MG/DL
RBC # BLD AUTO: 4.51 MILLION/UL (ref 3.88–5.62)
RBC #/AREA URNS AUTO: ABNORMAL /HPF
SODIUM SERPL-SCNC: 142 MMOL/L (ref 136–145)
SP GR UR STRIP.AUTO: 1.01 (ref 1–1.03)
TSH SERPL DL<=0.05 MIU/L-ACNC: 3.58 UIU/ML (ref 0.36–3.74)
UROBILINOGEN UR QL STRIP.AUTO: 0.2 E.U./DL
WBC # BLD AUTO: 6.53 THOUSAND/UL (ref 4.31–10.16)
WBC #/AREA URNS AUTO: ABNORMAL /HPF

## 2018-02-06 PROCEDURE — 85025 COMPLETE CBC W/AUTO DIFF WBC: CPT

## 2018-02-06 PROCEDURE — 36415 COLL VENOUS BLD VENIPUNCTURE: CPT

## 2018-02-06 PROCEDURE — 84443 ASSAY THYROID STIM HORMONE: CPT

## 2018-02-06 PROCEDURE — 80053 COMPREHEN METABOLIC PANEL: CPT

## 2018-02-06 PROCEDURE — 81001 URINALYSIS AUTO W/SCOPE: CPT | Performed by: INTERNAL MEDICINE

## 2018-02-14 ENCOUNTER — OFFICE VISIT (OUTPATIENT)
Dept: NEPHROLOGY | Facility: CLINIC | Age: 83
End: 2018-02-14
Payer: COMMERCIAL

## 2018-02-14 VITALS
BODY MASS INDEX: 25.86 KG/M2 | HEART RATE: 80 BPM | HEIGHT: 69 IN | WEIGHT: 174.6 LBS | SYSTOLIC BLOOD PRESSURE: 114 MMHG | DIASTOLIC BLOOD PRESSURE: 80 MMHG

## 2018-02-14 DIAGNOSIS — R80.8 OTHER PROTEINURIA: ICD-10-CM

## 2018-02-14 DIAGNOSIS — I10 BENIGN ESSENTIAL HYPERTENSION: ICD-10-CM

## 2018-02-14 DIAGNOSIS — N18.4 CHRONIC KIDNEY DISEASE, STAGE IV (SEVERE) (HCC): Primary | ICD-10-CM

## 2018-02-14 PROBLEM — E87.2 METABOLIC ACIDOSIS: Status: ACTIVE | Noted: 2018-02-14

## 2018-02-14 PROCEDURE — 99213 OFFICE O/P EST LOW 20 MIN: CPT | Performed by: PHYSICIAN ASSISTANT

## 2018-02-14 NOTE — PROGRESS NOTES
OFFICE FOLLOW UP - Nephrology   Yelitza Fuel 81 y o  male MRN: [id number]       ASSESSMENT and PLAN:  Diagnoses and all orders for this visit:    Chronic kidney disease, stage IV (severe) ([facility] [address] )  -     CBC; Future  -     PTH, intact; Future  -     Renal function panel; Future  -     Protein / creatinine ratio, urine; Future    Other proteinuria    Benign essential hypertension        1  Chronic kidney disease stage 4:  Likely due to diabetes, hypertension and history of hydronephrosis status post urostomy  Creatinine currently 2 6 which is fairly stable from his last creatinine 2 8 in September  He has had a prior workup to include hepatitis studies, complements, Anca and IgA which were all normal   We discussed avoiding NSAIDs and continuing to control his diabetes and blood pressure to help maintain good renal function  2   Hypertension:  Currently well controlled and has been off all his blood pressure medicines  3  Proteinuria:  He had 1 1 g protein in October and this is likely due to his diabetes  He has been off lisinopril as his blood pressure was low and if proteinuria worsens would consider restarting  4  Metabolic acidosis:  Improved after taking sodium bicarb and will continue this for now  5  Mineral bone disease:  Last phosphorus was low on PTH was normal    Patient will follow up in 3 months with Dr Kiah Poole  Patient will have repeat labs done prior to his next visit      HPI: Yelitza Tracy is a 81 y o  male who is here for chronic kidney disease  Overall he has been doing well since his last visit  He did have a virus a few weeks ago with upset stomach and some respiratory symptoms  He was not eating well at this time but states that his appetite has returned and he has gained the weight back that he lost   He does have a little bit of shortness of breath since the illness but it is improving  He has no edema  Has no current nausea, vomiting or diarrhea    He has an ileal conduit and states he has not had any blood in his urine  ROS:   All the systems were reviewed and were negative except as documented on the HPI  Allergies: Aspirin    Medications:   Current Outpatient Prescriptions:     albuterol (5 mg/mL) 0 5 % nebulizer solution, Inhale, Disp: , Rfl:     aspirin 81 MG tablet, Take 1 tablet by mouth daily, Disp: , Rfl:     Blood Glucose Monitoring Suppl (FREESTYLE FREEDOM LITE) w/Device KIT, by Does not apply route 3 (three) times a day, Disp: , Rfl:     cholecalciferol (VITAMIN D3) 1,000 units tablet, Take 1 tablet by mouth daily, Disp: 30 tablet, Rfl: 3    glucose blood (FREESTYLE LITE) test strip, by In Vitro route 3 (three) times a day, Disp: , Rfl:     Insulin Glargine (TOUJEO SOLOSTAR) injection pen 300 units/mL, Inject under the skin, Disp: , Rfl:     insulin lispro (HUMALOG KWIKPEN) 100 Units/mL SOPN, Inject under the skin, Disp: , Rfl:     Lancets (FREESTYLE) lancets, by Does not apply route, Disp: , Rfl:     Phenylephrine-DM-GG-APAP (DELSYM COUGH/COLD DAYTIME) 5--325 MG/10ML LIQD, Take 1 Dose by mouth 2 (two) times a day as needed (cough), Disp: 1 Bottle, Rfl: 0    sodium bicarbonate 650 mg tablet, Take 2 tablets by mouth 3 (three) times a day, Disp: , Rfl:     Past Medical History:   Diagnosis Date    Cancer ([facility] [address] )     prostate    [facility]     UTI (urinary tract infection)     RESOLVED 18 DEC 2012     Past Surgical History:   Procedure Laterality Date    APPENDECTOMY      BLADDER SURGERY      PROSTATE SURGERY       Family History   Problem Relation Age of Onset    Diabetes Mother      TYPE2    Diabetes Brother     Diabetes Family     Heart disease Family     Diabetes Daughter       reports that he has never smoked  He has never used smokeless tobacco  He reports that he does not drink alcohol or use drugs        Physical Exam:   Vitals:    02/14/18 1118   BP: 114/80   BP Location: Left arm   Patient Position: Sitting   Cuff Size: Standard   Weight: 79 2 kg (174 lb 9 6 oz)   Height: 5' 9" (1 753 m)     Body mass index is 25 78 kg/m²      General: no acute distress   Eyes: conjunctivae pink, anicteric sclerae  ENT: mucous membranes moist  Neck: supple, no JVD  Chest: clear to auscultation bilaterally with no wheezes, rale or rhochi  CVS: regular rate and rhythm   Abdomen: soft, non-tender, non-distended  Extremities: no lower extremity edema   Skin: no rash  Neuro: awake and alert       Lab Results:  Results for orders placed or performed in visit on 02/06/18   CBC and differential   Result Value Ref Range    WBC 6 53 4 31 - 10 16 Thousand/uL    RBC 4 51 3 88 - 5 62 Million/uL    Hemoglobin 12 9 12 0 - 17 0 g/dL    Hematocrit 37 8 36 5 - 49 3 %    MCV 84 82 - 98 fL    MCH 28 6 26 8 - 34 3 pg    MCHC 34 1 31 4 - 37 4 g/dL    RDW 14 9 11 6 - 15 1 %    MPV 9 8 8 9 - 12 7 fL    Platelets 923 339 - 937 Thousands/uL    nRBC 0 /100 WBCs    Neutrophils Relative 50 43 - 75 %    Lymphocytes Relative 38 14 - 44 %    Monocytes Relative 8 4 - 12 %    Eosinophils Relative 3 0 - 6 %    Basophils Relative 1 0 - 1 %    Neutrophils Absolute 3 26 1 85 - 7 62 Thousands/µL    Lymphocytes Absolute 2 48 0 60 - 4 47 Thousands/µL    Monocytes Absolute 0 50 0 17 - 1 22 Thousand/µL    Eosinophils Absolute 0 20 0 00 - 0 61 Thousand/µL    Basophils Absolute 0 06 0 00 - 0 10 Thousands/µL   TSH, 3rd generation with T4 reflex   Result Value Ref Range    TSH 3RD GENERATON 3 580 0 358 - 3 740 uIU/mL   Comprehensive metabolic panel   Result Value Ref Range    Sodium 142 136 - 145 mmol/L    Potassium 3 6 3 5 - 5 3 mmol/L    Chloride 115 (H) 100 - 108 mmol/L    CO2 19 (L) 21 - 32 mmol/L    Anion Gap 8 4 - 13 mmol/L    BUN 42 (H) 5 - 25 mg/dL    Creatinine 2 65 (H) 0 60 - 1 30 mg/dL    Glucose, Fasting 170 (H) 65 - 99 mg/dL    Calcium 8 4 8 3 - 10 1 mg/dL    AST 12 5 - 45 U/L    ALT 19 12 - 78 U/L    Alkaline Phosphatase 162 (H) 46 - 116 U/L    Total Protein 8 1 6 4 - 8 2 g/dL Albumin 3 4 (L) 3 5 - 5 0 g/dL    Total Bilirubin 0 29 0 20 - 1 00 mg/dL    eGFR 22 ml/min/1 73sq m               Portions of the record may have been created with voice recognition software  Occasional wrong word or "sound a like" substitutions may have occurred due to the inherent limitations of voice recognition software  Read the chart carefully and recognize, using context, where substitutions have occurred  If you have any questions, please contact the dictating provider

## 2018-02-14 NOTE — LETTER
February 14, 2018     Caryn Owens DO  [address] [facility] [address]    Patient: Yaya Mendieta   YOB: 1935   Date of Visit: 2/14/2018       Dear Dr Bigg Moore: Thank you for referring Yaya Mendieta to me for evaluation  Below are my notes for this consultation  If you have questions, please do not hesitate to call me  I look forward to following your patient along with you  Sincerely,        Carole Cordero PA-C        CC: DO Carole Canas PA-C  2/14/2018 11:41 AM  Sign at close encounter  OFFICE FOLLOW UP - Nephrology   Yaya Mendieta 81 y o  male MRN: [id number]       ASSESSMENT and PLAN:  Diagnoses and all orders for this visit:    Chronic kidney disease, stage IV (severe) ([facility] [address] )  -     CBC; Future  -     PTH, intact; Future  -     Renal function panel; Future  -     Protein / creatinine ratio, urine; Future    Other proteinuria    Benign essential hypertension        1  Chronic kidney disease stage 4:  Likely due to diabetes, hypertension and history of hydronephrosis status post urostomy  Creatinine currently 2 6 which is fairly stable from his last creatinine 2 8 in September  He has had a prior workup to include hepatitis studies, complements, Anca and IgA which were all normal   We discussed avoiding NSAIDs and continuing to control his diabetes and blood pressure to help maintain good renal function  2   Hypertension:  Currently well controlled and has been off all his blood pressure medicines  3  Proteinuria:  He had 1 1 g protein in October and this is likely due to his diabetes  He has been off lisinopril as his blood pressure was low and if proteinuria worsens would consider restarting  4  Metabolic acidosis:  Improved after taking sodium bicarb and will continue this for now  5  Mineral bone disease:  Last phosphorus was low on PTH was normal    Patient will follow up in 3 months with Dr Bridger Salvador    Patient will have repeat labs done prior to his next visit      HPI: Jesus Manuel Mosquera is a 81 y o  male who is here for chronic kidney disease  Overall he has been doing well since his last visit  He did have a virus a few weeks ago with upset stomach and some respiratory symptoms  He was not eating well at this time but states that his appetite has returned and he has gained the weight back that he lost   He does have a little bit of shortness of breath since the illness but it is improving  He has no edema  Has no current nausea, vomiting or diarrhea  He has an ileal conduit and states he has not had any blood in his urine  ROS:   All the systems were reviewed and were negative except as documented on the HPI      Allergies: Aspirin    Medications:   Current Outpatient Prescriptions:     albuterol (5 mg/mL) 0 5 % nebulizer solution, Inhale, Disp: , Rfl:     aspirin 81 MG tablet, Take 1 tablet by mouth daily, Disp: , Rfl:     Blood Glucose Monitoring Suppl (FREESTYLE FREEDOM LITE) w/Device KIT, by Does not apply route 3 (three) times a day, Disp: , Rfl:     cholecalciferol (VITAMIN D3) 1,000 units tablet, Take 1 tablet by mouth daily, Disp: 30 tablet, Rfl: 3    glucose blood (FREESTYLE LITE) test strip, by In Vitro route 3 (three) times a day, Disp: , Rfl:     Insulin Glargine (TOUJEO SOLOSTAR) injection pen 300 units/mL, Inject under the skin, Disp: , Rfl:     insulin lispro (HUMALOG KWIKPEN) 100 Units/mL SOPN, Inject under the skin, Disp: , Rfl:     Lancets (FREESTYLE) lancets, by Does not apply route, Disp: , Rfl:     Phenylephrine-DM-GG-APAP (DELSYM COUGH/COLD DAYTIME) 5--325 MG/10ML LIQD, Take 1 Dose by mouth 2 (two) times a day as needed (cough), Disp: 1 Bottle, Rfl: 0    sodium bicarbonate 650 mg tablet, Take 2 tablets by mouth 3 (three) times a day, Disp: , Rfl:     Past Medical History:   Diagnosis Date    Cancer ([facility] [address] )     prostate    [facility] mellitus ([facility] )     UTI (urinary tract infection)     RESOLVED 18 DEC 2012 Past Surgical History:   Procedure Laterality Date    APPENDECTOMY      BLADDER SURGERY      PROSTATE SURGERY       Family History   Problem Relation Age of Onset    Diabetes Mother      TYPE2    Diabetes Brother     Diabetes Family     Heart disease Family     Diabetes Daughter       reports that he has never smoked  He has never used smokeless tobacco  He reports that he does not drink alcohol or use drugs  Physical Exam:   Vitals:    02/14/18 1118   BP: 114/80   BP Location: Left arm   Patient Position: Sitting   Cuff Size: Standard   Weight: 79 2 kg (174 lb 9 6 oz)   Height: 5' 9" (1 753 m)     Body mass index is 25 78 kg/m²      General: no acute distress   Eyes: conjunctivae pink, anicteric sclerae  ENT: mucous membranes moist  Neck: supple, no JVD  Chest: clear to auscultation bilaterally with no wheezes, rale or rhochi  CVS: regular rate and rhythm   Abdomen: soft, non-tender, non-distended  Extremities: no lower extremity edema   Skin: no rash  Neuro: awake and alert       Lab Results:  Results for orders placed or performed in visit on 02/06/18   CBC and differential   Result Value Ref Range    WBC 6 53 4 31 - 10 16 Thousand/uL    RBC 4 51 3 88 - 5 62 Million/uL    Hemoglobin 12 9 12 0 - 17 0 g/dL    Hematocrit 37 8 36 5 - 49 3 %    MCV 84 82 - 98 fL    MCH 28 6 26 8 - 34 3 pg    MCHC 34 1 31 4 - 37 4 g/dL    RDW 14 9 11 6 - 15 1 %    MPV 9 8 8 9 - 12 7 fL    Platelets 344 208 - 806 Thousands/uL    nRBC 0 /100 WBCs    Neutrophils Relative 50 43 - 75 %    Lymphocytes Relative 38 14 - 44 %    Monocytes Relative 8 4 - 12 %    Eosinophils Relative 3 0 - 6 %    Basophils Relative 1 0 - 1 %    Neutrophils Absolute 3 26 1 85 - 7 62 Thousands/µL    Lymphocytes Absolute 2 48 0 60 - 4 47 Thousands/µL    Monocytes Absolute 0 50 0 17 - 1 22 Thousand/µL    Eosinophils Absolute 0 20 0 00 - 0 61 Thousand/µL    Basophils Absolute 0 06 0 00 - 0 10 Thousands/µL   TSH, 3rd generation with T4 reflex   Result Value Ref Range    TSH 3RD GENERATON 3 580 0 358 - 3 740 uIU/mL   Comprehensive metabolic panel   Result Value Ref Range    Sodium 142 136 - 145 mmol/L    Potassium 3 6 3 5 - 5 3 mmol/L    Chloride 115 (H) 100 - 108 mmol/L    CO2 19 (L) 21 - 32 mmol/L    Anion Gap 8 4 - 13 mmol/L    BUN 42 (H) 5 - 25 mg/dL    Creatinine 2 65 (H) 0 60 - 1 30 mg/dL    Glucose, Fasting 170 (H) 65 - 99 mg/dL    Calcium 8 4 8 3 - 10 1 mg/dL    AST 12 5 - 45 U/L    ALT 19 12 - 78 U/L    Alkaline Phosphatase 162 (H) 46 - 116 U/L    Total Protein 8 1 6 4 - 8 2 g/dL    Albumin 3 4 (L) 3 5 - 5 0 g/dL    Total Bilirubin 0 29 0 20 - 1 00 mg/dL    eGFR 22 ml/min/1 73sq m               Portions of the record may have been created with voice recognition software  Occasional wrong word or "sound a like" substitutions may have occurred due to the inherent limitations of voice recognition software  Read the chart carefully and recognize, using context, where substitutions have occurred  If you have any questions, please contact the dictating provider

## 2018-03-07 NOTE — PROGRESS NOTES
Chief Complaint  Chief Complaint Free Text Note Form: Patient presents to the clinic for management of diabetes  History of Present Illness  HPI: Mr Cristofer Menjivar is an [de-identified] yo male who required the use of an   The patient reports feeling well today and is not experiencing any adverse effects  The patient reports taking Lantus 50 units at bedtime, and Humalog with each meal  The patient states that he takes 10 units of Humalog if his blood sugar is at 120 mg/dL  He takes 1 additional unit of insulin for each 30 mg/dL his sugars are above 120  He states that his endocrinologist told him to administer it in this way  He brought his glucose meter with him today and his sugars over the past month have ranged from 250 to 400 mg/dL  The patient states that he no longer has to pay for Lantus out of pocket  His most recent A1c was 9 3%  The patient's BP to day was 124/76  Active Problems    1  Adhesive bursitis of left shoulder (726 0) (M75 02)   2  Anemia of chronic disease (285 29) (D63 8)   3  Benign essential hypertension (401 1) (I10)   4  Bladder cancer (188 9) (C67 9)   5  Cervical spinal stenosis (723 0) (M48 02)   6  Chronic kidney disease, stage 4 (severe) (585 4) (N18 4)   7  Colon cancer screening (V76 51) (Z12 11)   8  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 3)   9  Constipation (564 00) (K59 00)   10  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)   11  Diabetic nephropathy (250 40,583 81) (E11 21)   12  Diastolic dysfunction (838 7) (I51 9)   13  DM type 2 (diabetes mellitus, type 2) (250 00) (E11 9)   14  Dyslipidemia (272 4) (E78 5)   15  Encounter for screening colonoscopy (V76 51) (Z12 11)   16  Hypomagnesemia (275 2) (E83 42)   17  Leg skin lesion, right (709 9) (L98 9)   18  Metabolic acidosis (423 5) (E87 2)   19  Microscopic hematuria (599 72) (R31 2)   20  Need for prophylactic vaccination and inoculation against influenza (V04 81) (Z23)   21  Need for Tdap vaccination (V06 1) (Z23)   22  Need for vaccination with 13-polyvalent pneumococcal conjugate vaccine (V03 82) (Z23)   23  Need for Zostavax administration (V04 89) (Z23)   24  Non compliance w medication regimen (V15 81) (Z91 14)   25  Peripheral neuropathy (356 9) (G62 9)   26  Peripheral vascular disease (443 9) (I73 9)   27  Seborrheic keratosis (702 19) (L82 1)   28  Vitamin D deficiency (268 9) (E55 9)    Social History    · Denied: Drug use   · Never A Smoker   · Never Drank Alcohol   · Second hand smoke exposure (V15 89) (Z77 22)    Past Medical History    1  History of Bladder Cancer (V10 51)   2  Urinary tract infection (599 0) (N39 0)    Current Meds   1  Aspirin 81 MG Oral Tablet Delayed Release; take 1 tablet every day; Therapy: 30ZFR2374 to (Evaluate:15Yed5462)  Requested for: 89Pzo9172; Last   Rx:95Bwb8549 Ordered   2  FreeStyle Lancets Miscellaneous; TESTING THREE TIMES A DAY; Therapy: 14YDK7020 to (Evaluate:07Aug2017)  Requested for: 57Rhx4338; Last   Rx:12Aph1533 Ordered   3  FreeStyle Lite Device; Test blood sugar three times daily; Therapy: 18SHR6963 to (Micah Jc)  Requested for: 32CRF9065; Last   Rx:28Xty4245 Ordered   4  FreeStyle Lite Test In Vitro Strip; TEST BLOOS SUGAR 3 TIMES DAILY; Therapy: 33LTD8708 to (Evaluate:11May2017)  Requested for: 54NKS2284; Last   Rx:11Hep3245 Ordered   5  HumaLOG 100 UNIT/ML Subcutaneous Solution; INJECT 10 UNIT Before meals three   times a day; Therapy: 79WAQ8332 to (Evaluate:49Cva0242)  Requested for: 64Cmr9067; Last   Rx:09Aug2016 Ordered   6  Lantus SoloStar 100 UNIT/ML Subcutaneous Solution Pen-injector; INJECT 50 UNIT   Daily; Therapy: 23RIO3471 to (Evaluate:45Tzz7516)  Requested for: 49Tyx5552; Last   Rx:09Aug2016 Ordered   7  Levemir FlexTouch 100 UNIT/ML Subcutaneous Solution Pen-injector; INJECT 50 UNIT   Bedtime; Therapy: 56URA2861 to (Evaluate:20May2017)  Requested for: 14NYA5531; Last   Rx:25May2016 Ordered   8   Lisinopril 2 5 MG Oral Tablet; TAKE ONE TABLET BY MOUTH ONCE DAILY AS   DIRECTED; Therapy: 74DOW4252 to (Evaluate:08Wol3182)  Requested for: 80IEF9754; Last   Rx:15Ilt5273 Ordered   9  Pen Needles 31G X 6 MM Miscellaneous; USE AS DIRECTED; Therapy: 41VDD4885 to (Evaluate:57Hkv9949)  Requested for: 21Jan2016; Last   Rx:21Jan2016 Ordered   10  Pravastatin Sodium 20 MG Oral Tablet; Take one tablet daily; Therapy: 60YFT2244 to (Evaluate:69Ekr8214)  Requested for: 89ZRW3991; Last    Rx:25May2016 Ordered   11  Sodium Bicarbonate 650 MG Oral Tablet; Take 1 tablet twice daily; Therapy: 67ODF3486 to (Evaluate:24Sla7443)  Requested for: 53YXQ8397; Last    Rx:18Mar2016 Ordered   12  Vitamin D3 34056 UNIT Oral Capsule; take 1 capsule weekly for 8 weeks; Therapy: 15EFV2522 to (Evaluate:08Jun2016)  Requested for: 85PCK8355; Last    Rx:78Vdq8110 Ordered    Allergies    1  Aspir-81 TBEC    Vitals  Signs   Recorded: 71KOK2614 00:98QL   Systolic: 726, RUE, Sitting  Diastolic: 76, RUE, Sitting    Assessment    1  DM type 2 (diabetes mellitus, type 2) (250 00) (E11 9)    T2DM: Last recorded A1c was 9 3% and blood glucose readings have ranged form 250 to 400 mg/dL  The patient requires a more intensive insulin regimen to better control his diabetes  The patient is currently titrating his Humalog per his endocrinologist and taking Lantus 50 units HS  Will change patient to Humalog pen injector at the request of the patient  Patient's insurance covers Lantus, so he does not take Levemir anymore  HTN: Patient is controlled to goal with a BP of 124/76 with lisinopril 2 5 mg daily  No changes are required  Plan    1  HumaLOG 100 UNIT/ML Subcutaneous Solution   2  Levemir FlexTouch 100 UNIT/ML Subcutaneous Solution Pen-injector   3  HumaLOG KwikPen 100 UNIT/ML Subcutaneous Solution Pen-injector; INJECT   SUBCUTANEOUSLY AS DIRECTED  TDD:30 units   4  Lantus SoloStar 100 UNIT/ML Subcutaneous Solution Pen-injector; INJECT 60   UNIT Daily TDD:60 unit   5   Pen Needles 31G X 6 MM Miscellaneous; USE AS DIRECTED    1  Increase Lantus to 60 units HS  2  D/C Levemir because patient no longer has difficulty receiving Lantus  3  Switch patient to Humalog KwikPen 10 units with meals  4  Continue all other medications as directed  5  Continue testing blood sugar 3 to 4 times per day  6  Follow up in 1 month  Future Appointments    Date/Time Provider Specialty Site   09/01/2016 01:40 PM FEDERICA Tucker  Nephrology [place] [address]   09/27/2016 08:10 AM Specialty Clinic, Podiatry  [place] [address] [address]   09/28/2016 02:10 PM Franco Dorado, DO Internal Medicine [address] PCP   10/04/2016 01:00 PM Reza Lei Pharmacist [address] PCP   02/08/2017 02:30 PM Specialty Clinic, Dermatology  [place] [address] [place] [address]     Attending Note  I have met with the patient and agree with the progress note  His A1C is higher, but his target is likely higher than others given his age  Given he is managed by Endocrinology, will only recommend small changes  At this time, will not make changes to bolus insulin given risk for hypoglycemia  Will increase basal to max dose per injection (60 units) and discuss with him and endocrinologist if change to glargine U-300 is warranted  Signatures   Electronically signed by : Anyi Duncan, Walter  D ; Aug 23 2016  5:10PM EST                       (Author)    Electronically signed by : BLAYNE Abraham; Aug 24 2016  1:18PM EST                       (Author)    Electronically signed by : FEDERICA Rosario ; Aug 25 2016 11:57AM EST                       (Author)

## 2018-03-07 NOTE — PROGRESS NOTES
Chief Complaint   DMII follow up appointment        History of Present Illness   The patient is here to follow up with his type II DM  He feels well today but mentioned that he received a letter from his insurance that his Lantus will no longer be covered  He however, does mention continued compliance with his novolog  His average blood blood glucose is 200  and a recent A1c of 8 8% on 40 units of Lantus and 10-12 units TID of Novolog  He used to get hypoglycemic but since he started preemptively reducing his dose with glucose levels of less than 150mg/dL he has not had any hypoglycemic episodes in weeks  He has a Thiells risk score of 30% but has an urticaria if he takes aspirin everyday but can tolerate every other day dosing  His BP is controlled  Active Problems     1  Abnormal finding on chest xray (793 2) (R93 8)   2  Adhesive bursitis of left shoulder (726 0) (M75 02)   3  Anemia of chronic disease (285 29) (D63 8)   4  Bladder cancer (188 9) (C67 9)   5  Cervical spinal stenosis (723 0) (M48 02)   6  Colon cancer screening (V76 51) (Z12 11)   7  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 3)   8  Constipation (564 00) (K59 00)   9  Cough (786 2) (R05)   10  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)   11  Diabetic nephropathy (250 40,583 81) (E11 21)   12  Diastolic dysfunction (031 2) (I51 9)   13  DM type 2 (diabetes mellitus, type 2) (250 00) (E11 9)   14  Dyslipidemia (272 4) (E78 5)   15  Encounter for screening colonoscopy (V76 51) (Z12 11)   16  Fever (780 60) (R50 9)   17  Hypomagnesemia (275 2) (E83 42)   18  Knee pain, left (719 46) (M25 562)   19  Limb pain (729 5) (M79 609)   20  Lung mass (786 6) (R91 8)   21  Lung nodule, solitary (793 11) (R91 1)   22  Mass of lower lobe of right lung (786 6) (R91 8)   23  Metabolic acidosis (959 8) (E87 2)   24  Microscopic hematuria (599 72) (R31 2)   25  Neck Strain (847 0)   26   Need for prophylactic vaccination and inoculation against influenza (V04 81) (Z23)   27  Need for Tdap vaccination (V06 1) (Z23)   28  Need for vaccination with 13-polyvalent pneumococcal conjugate vaccine (V03 82) (Z23)   29  Need for Zostavax administration (V04 89) (Z23)   30  Non compliance w medication regimen (V15 81) (Z91 14)   31  Other abnormal finding of urine (791 9) (R82 99)   32  Peripheral neuropathy (356 9) (G62 9)   33  Peripheral vascular disease (443 9) (I73 9)   34  Right knee pain (719 46) (M25 561)   35  Seborrheic keratosis (702 19) (L82 1)   36  Shoulder joint pain, unspecified laterality   37  Urinary tract infection (599 0) (N39 0)    Vitamin D deficiency (268 9) (E55 9)     - I have no recently level on the system  I will request this to be checked along with other labs in March  Proteinuria (791 0) (R80 9)     - 12/2012 >>>>>>>>>>> MA / Creat   108       Chronic kidney disease, stage 4 (severe) (585 4) (N18 4)     - Patient has had trouble scheduling follow up with nephrology because of recent urgent travel to [place], I will re-refer him to [facility] nephrology  Last microalbumin/cr ration was 55 in 2015  Will check again in March for 2015 and continue with ACE-I  Last Cr was 2 48 in September I will recheck this with BMP  Social History    · Denied: Drug use   · Never A Smoker   · Never Drank Alcohol   · Second hand smoke exposure (V15 89) (Z77 22)    Past Medical History    1  History of Bladder Cancer (V10 51)   2  Urinary tract infection (599 0) (N39 0)    Current Meds   1  FreeStyle Lancets Miscellaneous; TESTING THREE TIMES A DAY; Therapy: 30JPT4222 to (Evaluate:22Jun2016)  Requested for: 62RYM2762; Last   Rx:55Vil8218 Ordered   2  FreeStyle Lite Device; Test blood sugar three times daily; Therapy: 76OGI9698 to (Evaluate:49Lpe8804)  Requested for: 86EHH7098; Last   Rx:47Zym3859 Ordered   3  FreeStyle Lite Test In Vitro Strip; TEST 3 TIMES DAILY;    Therapy: 60JTN4985 to (Evaluate:52Yic5711)  Requested for: 32NZK1774; Last Rx:35Vws6837 Ordered   4  Lantus SoloStar 100 UNIT/ML Subcutaneous Solution Pen-injector; inject 40 units daily   at bedtime; Therapy: 99RBH2693 to (Marisol Fitting)  Requested for: 76YQW9488; Last   Rx:05Sqq5951 Ordered   5  Lisinopril 2 5 MG Oral Tablet; TAKE 1 TABLET DAILY AS DIRECTED; Therapy: 44XZP8433 to (Lee Avina)  Requested for: 30ILA0792; Last   Rx:89Rnk4387 Ordered   6  Pen Needles 31G X 6 MM Miscellaneous; USE AS DIRECTED; Therapy: 10TQX1777 to (Evaluate:84Tdj0431)  Requested for: 21Jan2016; Last   Rx:21Jan2016 Ordered   7  Pravastatin Sodium 20 MG Oral Tablet; TAKE 1 TABLET DAILY; Therapy: 35LPU6595 to (Lee Avina)  Requested for: 58Vlf4751; Last   Rx:79Mks2182 Ordered   8  Vitamin D3 1000 UNIT Oral Tablet; TAKE 1 TABLET DAILY; Therapy: 48WHJ8633 to (Lee Avina)  Requested for: 27YXR8651; Last   Rx:71Avo1251 Ordered    Allergies    1  Aspir-81 TBEC    Vitals   /BP: Sitting: /68     Assessment   DMII:  A1c went from 8 6 to 8 8% on patient's current regimen  Average fasting glucose is 200  Patient needs an increase in longer acting insulin  His Lantus is no longer covered so Detemir is an appropriate substitute  Mealtime glucose checks will provide an idea of post prandial glucose levels  CV: BP is controlled but patient has framingham risk of 30%  He can tolerate every other day dosing so that was initiated      Plan    1  Lantus SoloStar 100 UNIT/ML Subcutaneous Solution Pen-injector   2  Levemir FlexTouch 100 UNIT/ML Subcutaneous Solution Pen-injector; INJECT 50   UNIT Bedtime     DMII: Stop Lantus 40 units HS and initiate Levemir 50 units HS  Check blood glucose twice daily; before breakfast and before any mealtime  CV: Initiate Aspirin 81mg PO every other day      Future Appointments    Date/Time Provider Specialty Site   04/15/2016 10:40 AM FEDERICA Mckenzie   Nephrology [facility]     Attending Note   I have reviewed the patient case and progress note with the resident and agree  The patient continues to hold his insulin doses at inappropriate times, despite continued education  His basal dose was increased and product changed to one on his insurance formulary  Signatures   Electronically signed by : Jerilyn Trevino, Pharm  D ; Feb 17 2016 10:30AM EST                       (Author)    Electronically signed by : BLAYNE Leavitt; Feb 26 2016  1:25PM EST                       (Author)    Electronically signed by : Jack Hansen DO; Feb 26 2016  1:36PM EST                       (Review)

## 2018-03-07 NOTE — PROGRESS NOTES
Chief Complaint  Chief Complaint Free Text Note Form: Patient present to the clinic for diabetes follow-up  History of Present Illness  HPI: Mr Terra Oshea is an [de-identified] yo male who required the use of an   Patient reports feeling well today and not experiencing any adverse effects  He reports taking Lantus 60 units at bedtime, and Humalog 10 units with each meal  He brought record of his home blood glucose readings, which he takes periodically in the morning when he wakes up before eating  His most recent A1c was 9 3%  The patient's BP today was 120/70  Active Problems    1  Adhesive bursitis of left shoulder (726 0) (M75 02)   2  Anemia of chronic disease (285 29) (D63 8)   3  Benign essential hypertension (401 1) (I10)   4  Bladder cancer (188 9) (C67 9)   5  Cervical spinal stenosis (723 0) (M48 02)   6  Chronic kidney disease, stage 4 (severe) (585 4) (N18 4)   7  Colon cancer screening (V76 51) (Z12 11)   8  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 3)   9  Constipation (564 00) (K59 00)   10  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)   11  Diabetic nephropathy (250 40,583 81) (E11 21)   12  Diastolic dysfunction (799 6) (I51 9)   13  DM type 2 (diabetes mellitus, type 2) (250 00) (E11 9)   14  Dyslipidemia (272 4) (E78 5)   15  Encounter for screening colonoscopy (V76 51) (Z12 11)   16  Hypomagnesemia (275 2) (E83 42)   17  Leg skin lesion, right (709 9) (L98 9)   18  Lower extremity ulceration (707 10) (L97 909)   19  Metabolic acidosis (903 9) (E87 2)   20  Microscopic hematuria (599 72) (R31 29)   21  Need for prophylactic vaccination and inoculation against influenza (V04 81) (Z23)   22  Need for Tdap vaccination (V06 1) (Z23)   23  Need for vaccination with 13-polyvalent pneumococcal conjugate vaccine (V03 82) (Z23)   24  Need for Zostavax administration (V04 89) (Z23)   25  Non compliance w medication regimen (V15 81) (Z91 14)   26  Peripheral neuropathy (356 9) (G62 9)   27   Peripheral vascular disease (443 9) (I73 9)   28  Seborrheic keratosis (702 19) (L82 1)   29  Secondary hyperparathyroidism of renal origin (588 81) (N25 81)   30  Vitamin D deficiency (268 9) (E55 9)    Social History    · Denied: Drug use   · Never A Smoker   · Never Drank Alcohol   · Second hand smoke exposure (V15 89) (Z77 22)    Past Medical History    1  History of Bladder Cancer (V10 51)   2  Urinary tract infection (599 0) (N39 0)    Current Meds   1  Aspirin 81 MG Oral Tablet Delayed Release; take 1 tablet every day; Therapy: 32DOD4391 to (Evaluate:47Lct1829)  Requested for: 59Mrx1591; Last   Rx:56Zdz2888 Ordered   2  FreeStyle Lancets Miscellaneous; TESTING THREE TIMES A DAY; Therapy: 72ZNG5307 to (Evaluate:17Dvi7550)  Requested for: 46Gfe8074; Last   Rx:44Ixh7920 Ordered   3  FreeStyle Lite Device; Test blood sugar three times daily; Therapy: 56LPP2826 to (Arleen Walker)  Requested for: 29IEA2413; Last   Rx:18Ylz5533 Ordered   4  FreeStyle Lite Test In Vitro Strip; TEST BLOOS SUGAR 3 TIMES DAILY; Therapy: 45JQV6039 to (Evaluate:19Zmq1576)  Requested for: 22IDY7433; Last   Rx:09Axl8044 Ordered   5  HumaLOG KwikPen 100 UNIT/ML Subcutaneous Solution Pen-injector; INJECT   SUBCUTANEOUSLY AS DIRECTED  TDD:30 units; Therapy: 57Kps3637 to (Last Tamara Mayorga)  Requested for: 80Iyy8115; Status:   ACTIVE - Transmit to Pharmacy - Awaiting Verification Ordered   6  Lantus SoloStar 100 UNIT/ML Subcutaneous Solution Pen-injector; INJECT 60 UNIT   Daily TDD:60 unit; Therapy: 87Tnn1819 to (Evaluate:60Bnw7591)  Requested for: 14Klv0504; Last   Rx:44Fpa6567; Status: [address] earline Martel Verification Ordered   7  Lisinopril 2 5 MG Oral Tablet; TAKE ONE TABLET BY MOUTH ONCE DAILY AS   DIRECTED; Therapy: 02VNK8652 to (Evaluate:01Gxu6432)  Requested for: 39UYG0574; Last   Rx:62Umz9854 Ordered   8  Pen Needles 31G X 6 MM Miscellaneous; USE AS DIRECTED;    Therapy: 40LQB6946 to (Evaluate:21Mar2017) Requested for: 14Dtq6640; Last   Rx:55Aco9856; Status: ACTIVE - Transmit to [facility] Verification Ordered   9  Pravastatin Sodium 20 MG Oral Tablet; take 1 tablet by mouth daily; Therapy: 95NAB2645 to (Evaluate:14Mar2017)  Requested for: 82Lmt6276; Last   Rx:42Lxh4879 Ordered   10  Sodium Bicarbonate 650 MG Oral Tablet; TAKE 1 TABLET 3 TIMES DAILY WITH MEALS; Therapy: 80QZR3778 to (Evaluate:67Vcg4662)  Requested for: 14Plp9183 Recorded   11  Vitamin D3 20843 UNIT Oral Capsule; take 1 capsule weekly for 8 weeks; Therapy: 83ETJ7187 to (Evaluate:08Jun2016)  Requested for: 34AIZ8988; Last    Rx:57Jwf6882 Ordered    Allergies    1  Aspir-81 TBEC    Vitals  Signs   Recorded: 60GHA4376 38:97GS   Systolic: 990, RUE, Sitting  Diastolic: 70, RUE, Sitting  Heart Rate: 64, R Radial  Pulse Quality: Normal, R Radial    Assessment    1  DM type 2 (diabetes mellitus, type 2) (250 00) (E11 9)      T2DM: Last recorded A1c was 9 3% and blood glucose readings have ranged from 100 to 200mg/dL  The patient's morning fasting glucose readings are within a relatively acceptable range  Patient's increased A1c from last reading of 8 8% may be due to increasing blood glucose in the afternoon and evening  Will have patient continue to check blood glucose once daily, but ask that he check his glucose at alternating times between morning and afternoon/evening  Continue Lantus 60 units at bedtime, and Humalog 10 units TID w/ meals  Plan  1  Continue Lantus Solostar 100 UNIT/ML Subcutaneous Solution Pen-injector; INJECT 60 UNITS daily TDD: 60 units  2  Continue Humalog KwikPen 10 units TID with meals  3  Continue testing blood sugar once daily, but alternating testing times between morning and afternoon/evening  4  Continue all other medications as directed  5  Follow up in 1 month  Recommend repeat A1c at next follow-up visit        Future Appointments    Date/Time Provider Specialty Site   11/08/2016 01:45 PM Dario Roblero, [facility] PCP   11/17/2016 09:35 AM Franco Dorado, DO Internal Medicine [address] PCP   12/16/2016 01:10 PM Specialty Clinic, [address] [place] [address]     Attending Note  I have met with the patient and agree with the plan as written  His target A1C is higher than others, but it is unclear how close to 7 0 it should be to avoid progression to ESRD  Given his age, he may be OK residing somewhere around 8%, though this is not clear  He is adherent to his meds and has the necessary support at home  I am cautious about making too many changes to his bolus insulin because of the higher risk for hypoglycemia  Signatures   Electronically signed by : Walter Rivera  D ; Oct  4 2016  4:01PM EST                       (Author)    Electronically signed by : BLAYNE Abraham; Oct  5 2016 11:16AM EST                       (Author)    Electronically signed by : FEDERICA Goodman ; Oct 10 2016  2:26PM EST                       (Review)

## 2018-03-18 DIAGNOSIS — Z79.4 TYPE 2 DIABETES MELLITUS WITH COMPLICATION, WITH LONG-TERM CURRENT USE OF INSULIN (HCC): Primary | ICD-10-CM

## 2018-03-18 DIAGNOSIS — E11.8 TYPE 2 DIABETES MELLITUS WITH COMPLICATION, WITH LONG-TERM CURRENT USE OF INSULIN (HCC): Primary | ICD-10-CM

## 2018-03-19 ENCOUNTER — TELEPHONE (OUTPATIENT)
Dept: INTERNAL MEDICINE CLINIC | Facility: CLINIC | Age: 83
End: 2018-03-19

## 2018-03-19 DIAGNOSIS — E11.9 DIABETES MELLITUS, TYPE II, INSULIN DEPENDENT (HCC): Primary | ICD-10-CM

## 2018-03-19 DIAGNOSIS — Z79.4 DIABETES MELLITUS, TYPE II, INSULIN DEPENDENT (HCC): Primary | ICD-10-CM

## 2018-04-25 ENCOUNTER — OFFICE VISIT (OUTPATIENT)
Dept: INTERNAL MEDICINE CLINIC | Facility: CLINIC | Age: 83
End: 2018-04-25
Payer: COMMERCIAL

## 2018-04-25 VITALS
HEART RATE: 76 BPM | SYSTOLIC BLOOD PRESSURE: 112 MMHG | BODY MASS INDEX: 25.8 KG/M2 | TEMPERATURE: 97.8 F | DIASTOLIC BLOOD PRESSURE: 72 MMHG | HEIGHT: 69 IN | WEIGHT: 174.16 LBS

## 2018-04-25 DIAGNOSIS — E55.9 VITAMIN D DEFICIENCY: ICD-10-CM

## 2018-04-25 DIAGNOSIS — E11.9 DIABETES MELLITUS, TYPE II, INSULIN DEPENDENT (HCC): Primary | ICD-10-CM

## 2018-04-25 DIAGNOSIS — N18.4 CHRONIC KIDNEY DISEASE, STAGE IV (SEVERE) (HCC): ICD-10-CM

## 2018-04-25 DIAGNOSIS — C67.9 BLADDER CANCER (HCC): ICD-10-CM

## 2018-04-25 DIAGNOSIS — Z23 NEED FOR PROPHYLACTIC VACCINATION AND INOCULATION AGAINST INFLUENZA: ICD-10-CM

## 2018-04-25 DIAGNOSIS — Z79.4 DIABETES MELLITUS, TYPE II, INSULIN DEPENDENT (HCC): Primary | ICD-10-CM

## 2018-04-25 DIAGNOSIS — E11.8 TYPE 2 DIABETES MELLITUS WITH COMPLICATION, UNSPECIFIED WHETHER LONG TERM INSULIN USE: ICD-10-CM

## 2018-04-25 DIAGNOSIS — I73.9 PERIPHERAL VASCULAR DISEASE (HCC): ICD-10-CM

## 2018-04-25 DIAGNOSIS — F32.A DEPRESSION: ICD-10-CM

## 2018-04-25 LAB — SL AMB POCT HEMOGLOBIN AIC: NORMAL

## 2018-04-25 PROCEDURE — 83036 HEMOGLOBIN GLYCOSYLATED A1C: CPT | Performed by: INTERNAL MEDICINE

## 2018-04-25 PROCEDURE — 99213 OFFICE O/P EST LOW 20 MIN: CPT | Performed by: INTERNAL MEDICINE

## 2018-04-25 PROCEDURE — 3725F SCREEN DEPRESSION PERFORMED: CPT | Performed by: INTERNAL MEDICINE

## 2018-04-25 RX ORDER — SERTRALINE HYDROCHLORIDE 25 MG/1
50 TABLET, FILM COATED ORAL DAILY
Qty: 90 TABLET | Refills: 0 | Status: SHIPPED | OUTPATIENT
Start: 2018-04-25 | End: 2018-04-25 | Stop reason: SDUPTHER

## 2018-04-25 RX ORDER — SERTRALINE HYDROCHLORIDE 25 MG/1
25 TABLET, FILM COATED ORAL DAILY
Qty: 90 TABLET | Refills: 0 | Status: ON HOLD | OUTPATIENT
Start: 2018-04-25 | End: 2018-07-18

## 2018-04-25 NOTE — ASSESSMENT & PLAN NOTE
· Patient denies SI/HI, however mood about the same as last time due to stressors at home  Has a sister who is ill with cancer  · Depression screening 2 today  · Start low-dose sertraline  · Re-assess on f/u

## 2018-04-25 NOTE — PROGRESS NOTES
Assessment/Plan:    DM type 2 (diabetes mellitus, type 2) ([facility])  · A1c 9 0% in the office today  · Increase mealtime novolog to 12 units TID with meals  · Can continue toujeo 45 units at night  Patient reluctant to increase this dose as he says he has gotten hypoglycemic in the past with higher doses  Depression  · Patient denies SI/HI, however mood about the same as last time due to stressors at home  Has a sister who is ill with cancer  · Depression screening 2 today  · Start low-dose sertraline  · Re-assess on f/u  Problem List Items Addressed This Visit        Endocrine    DM type 2 (diabetes mellitus, type 2) ([facility])     · A1c 9 0% in the office today  · Increase mealtime novolog to 12 units TID with meals  · Can continue toujeo 45 units at night  Patient reluctant to increase this dose as he says he has gotten hypoglycemic in the past with higher doses  Relevant Medications    insulin aspart (NovoLOG) 100 Units/mL SOPN    Other Relevant Orders    POCT diabetic eye exam    POCT hemoglobin A1c (Completed)       Cardiovascular and Mediastinum    Peripheral vascular disease ([facility])    Relevant Medications    aspirin 81 MG tablet       Genitourinary    Chronic kidney disease, stage IV (severe) ([facility])    Relevant Medications    cholecalciferol (VITAMIN D3) 1,000 units tablet    Bladder cancer [facility])    Relevant Orders    Ambulatory referral to Urology       Other    Vitamin D deficiency    Depression     · Patient denies SI/HI, however mood about the same as last time due to stressors at home  Has a sister who is ill with cancer  · Depression screening 2 today  · Start low-dose sertraline  · Re-assess on f/u            Relevant Medications    sertraline (ZOLOFT) 25 mg tablet      Other Visit Diagnoses     Diabetes mellitus, type II, insulin dependent ([facility])    -  Primary    Relevant Medications    insulin aspart (NovoLOG) 100 Units/mL SOPN    Other Relevant Orders    Ambulatory referral to Podiatry    Need for prophylactic vaccination and inoculation against influenza                Subjective:      Patient ID: Manju Tavares is a 81 y o  male  Pt here for follow up of a January appt  Says he feels more or less the same since his last visit  His last appt he was seen for some depressive symptoms and weight loss  In his last appt he weighed 166, today he weighs 174  He says at times he feels depressed but he tries to remain positive  He denies thoughts wanting to hurt himself or others  Says he is eating and drinking well  The following portions of the patient's history were reviewed and updated as appropriate: current medications and problem list     Review of Systems   Constitutional: Positive for unexpected weight change  Negative for chills and fever  Cardiovascular: Negative for chest pain  Gastrointestinal: Positive for abdominal pain  Musculoskeletal: Positive for arthralgias  Objective:    /72   Pulse 76   Temp 97 8 °F (36 6 °C)   Ht 5' 9" (1 753 m)   Wt 79 kg (174 lb 2 6 oz)   BMI 25 72 kg/m²      Physical Exam   Constitutional: He is oriented to person, place, and time  He appears well-developed and well-nourished  No distress  HENT:   Head: Normocephalic and atraumatic  Mouth/Throat: Oropharynx is clear and moist  No oropharyngeal exudate  Eyes: EOM are normal  Pupils are equal, round, and reactive to light  Neck: Normal range of motion  Neck supple  Cardiovascular: Normal rate, regular rhythm, normal heart sounds and intact distal pulses  Pulmonary/Chest: Effort normal and breath sounds normal  No respiratory distress  Abdominal: Soft  There is no tenderness  Ostomy bag in place  No sign of infection  Musculoskeletal: Normal range of motion  He exhibits no edema or tenderness  Lymphadenopathy:     He has no cervical adenopathy  Neurological: He is alert and oriented to person, place, and time  No cranial nerve deficit  Coordination normal    Skin: Skin is warm and dry  He is not diaphoretic  Psychiatric: He has a normal mood and affect

## 2018-04-25 NOTE — PATIENT INSTRUCTIONS
· Start sertraline once a day  · Increase your novolog to 12 units TID with meals  · Keep taking Toujeo 45 units at night  · Return in 3 months

## 2018-04-25 NOTE — ASSESSMENT & PLAN NOTE
· A1c 9 0% in the office today  · Increase mealtime novolog to 12 units TID with meals  · Can continue toujeo 45 units at night  Patient reluctant to increase this dose as he says he has gotten hypoglycemic in the past with higher doses

## 2018-04-30 ENCOUNTER — OFFICE VISIT (OUTPATIENT)
Dept: MULTI SPECIALTY CLINIC | Facility: CLINIC | Age: 83
End: 2018-04-30
Payer: COMMERCIAL

## 2018-04-30 VITALS
WEIGHT: 176.37 LBS | BODY MASS INDEX: 26.12 KG/M2 | TEMPERATURE: 97.5 F | DIASTOLIC BLOOD PRESSURE: 62 MMHG | SYSTOLIC BLOOD PRESSURE: 102 MMHG | HEIGHT: 69 IN | HEART RATE: 76 BPM

## 2018-04-30 DIAGNOSIS — Z79.4 DIABETES MELLITUS, TYPE II, INSULIN DEPENDENT (HCC): ICD-10-CM

## 2018-04-30 DIAGNOSIS — E11.9 DIABETES MELLITUS, TYPE II, INSULIN DEPENDENT (HCC): ICD-10-CM

## 2018-04-30 DIAGNOSIS — B35.3 TINEA PEDIS, UNSPECIFIED LATERALITY: Primary | ICD-10-CM

## 2018-04-30 PROCEDURE — 99203 OFFICE O/P NEW LOW 30 MIN: CPT | Performed by: PODIATRIST

## 2018-04-30 RX ORDER — CLOTRIMAZOLE 1 %
CREAM (GRAM) TOPICAL 2 TIMES DAILY
Qty: 30 G | Refills: 4 | Status: SHIPPED | OUTPATIENT
Start: 2018-04-30

## 2018-04-30 NOTE — PROGRESS NOTES
Podiatry Clinic Visit  Manju Tavares 81 y o  male MRN: [id number]  Encounter: [id number]    Assessment/Plan     Assessment:  1  DM, A1c 9 0% (4/25/18)  2  Tinea pedis    Plan:  - Patient was seen/examined   - rx: clotrimazole 1% cream BID  - discussed the importance of glycemic control, shoe gear, and proper diabetic foot care  - dispensed instructions on proper diabetic foot care  - all questions and concerns addressed  - RTC in 1 year as NVS intact      History of Present Illness     HPI:  Manju Tavares is a 81 y o  male who returns to clinic for diabetic risk assessment and itchiness in plantar heel  Pt states her sugar this morning was 165 today  His last visit to her PCP was last week  Pt reports numbness but denies burning and tingling  Denies any complaints to lower extremities  Pt denies any recent nausea, fever, vomiting, chills, SOB or chest pain  Pt ambulates with cane and denies any pain or discomfort      Consults  Review of Systems   Constitutional: Negative  HENT: Negative  Eyes: Negative  Respiratory: Negative  Cardiovascular: Negative  Gastrointestinal: Negative  Musculoskeletal: Negative   Skin: itchiness  Neurological: Negative          Historical Information   Past Medical History:   Diagnosis Date    Cancer [facility])     prostate    Diabetes mellitus ([facility] [address] )     UTI (urinary tract infection)     RESOLVED 18 DEC 2012     Past Surgical History:   Procedure Laterality Date    APPENDECTOMY      BLADDER SURGERY      PROSTATE SURGERY       Social History   History   Alcohol Use No     Comment: "Hx of social alcohol use"      History   Drug Use No     History   Smoking Status    Never Smoker   Smokeless Tobacco    Never Used     Comment: 2ND HAND SMOKE EXPOSURE     Family History:   Family History   Problem Relation Age of Onset    Diabetes Mother      TYPE2    Diabetes Brother     Diabetes Family     Heart disease Family     Diabetes Daughter        Meds/Allergies     (Not in a hospital admission)  Allergies   Allergen Reactions    Aspirin Itching       Objective     Current Vitals: There were no vitals taken for this visit  General Appearance:    Alert, cooperative, no distress           Extremities:   MMT is 5/5 to all compartments of the LE, +0/4 edema B/L, Digital ROM is intact,    Pulses:   R DP is +1/4, R PT is +1/4, L DP is +1/4, L PT is +1/4, CFT< 3sec to all digits   Skin: No open Lesions  Skin of the LE is normal texture, turgor  No edema, no erythema; no clinical signs of infection  No maceration in interspaces  Some annular lesions on plantar aspect of heels       Neurologic:   CNII-XII intact  Normal strength, sensation and reflexes       Throughout  Gross sensation is intact   Protective sensation is Intact

## 2018-07-13 DIAGNOSIS — E11.9 DIABETES MELLITUS, TYPE II, INSULIN DEPENDENT (HCC): ICD-10-CM

## 2018-07-13 DIAGNOSIS — Z79.4 DIABETES MELLITUS, TYPE II, INSULIN DEPENDENT (HCC): ICD-10-CM

## 2018-07-13 RX ORDER — INSULIN ASPART 100 [IU]/ML
INJECTION, SOLUTION INTRAVENOUS; SUBCUTANEOUS
Qty: 15 ML | Refills: 2 | Status: SHIPPED | OUTPATIENT
Start: 2018-07-13 | End: 2018-11-05 | Stop reason: SDUPTHER

## 2018-07-16 ENCOUNTER — APPOINTMENT (EMERGENCY)
Dept: RADIOLOGY | Facility: HOSPITAL | Age: 83
End: 2018-07-16
Payer: COMMERCIAL

## 2018-07-16 ENCOUNTER — HOSPITAL ENCOUNTER (OUTPATIENT)
Facility: HOSPITAL | Age: 83
Setting detail: OBSERVATION
Discharge: HOME | End: 2018-07-18
Attending: EMERGENCY MEDICINE | Admitting: INTERNAL MEDICINE
Payer: COMMERCIAL

## 2018-07-16 DIAGNOSIS — K21.9 GERD (GASTROESOPHAGEAL REFLUX DISEASE): ICD-10-CM

## 2018-07-16 DIAGNOSIS — N17.9 AKI (ACUTE KIDNEY INJURY) (HCC): Primary | ICD-10-CM

## 2018-07-16 DIAGNOSIS — F32.A DEPRESSION: ICD-10-CM

## 2018-07-16 DIAGNOSIS — R06.02 SHORTNESS OF BREATH: ICD-10-CM

## 2018-07-16 LAB
ALBUMIN SERPL BCP-MCNC: 3.4 G/DL (ref 3.5–5)
ALP SERPL-CCNC: 158 U/L (ref 46–116)
ALT SERPL W P-5'-P-CCNC: 15 U/L (ref 12–78)
ANION GAP SERPL CALCULATED.3IONS-SCNC: 10 MMOL/L (ref 4–13)
AST SERPL W P-5'-P-CCNC: 6 U/L (ref 5–45)
BASOPHILS # BLD AUTO: 0.03 THOUSANDS/ΜL (ref 0–0.1)
BASOPHILS NFR BLD AUTO: 0 % (ref 0–1)
BILIRUB SERPL-MCNC: 0.34 MG/DL (ref 0.2–1)
BUN SERPL-MCNC: 43 MG/DL (ref 5–25)
CALCIUM SERPL-MCNC: 8.7 MG/DL (ref 8.3–10.1)
CHLORIDE SERPL-SCNC: 107 MMOL/L (ref 100–108)
CO2 SERPL-SCNC: 18 MMOL/L (ref 21–32)
CREAT SERPL-MCNC: 3.46 MG/DL (ref 0.6–1.3)
EOSINOPHIL # BLD AUTO: 0.02 THOUSAND/ΜL (ref 0–0.61)
EOSINOPHIL NFR BLD AUTO: 0 % (ref 0–6)
ERYTHROCYTE [DISTWIDTH] IN BLOOD BY AUTOMATED COUNT: 14.1 % (ref 11.6–15.1)
GFR SERPL CREATININE-BSD FRML MDRD: 16 ML/MIN/1.73SQ M
GLUCOSE SERPL-MCNC: 336 MG/DL (ref 65–140)
HCT VFR BLD AUTO: 36.4 % (ref 36.5–49.3)
HGB BLD-MCNC: 11.8 G/DL (ref 12–17)
IMM GRANULOCYTES # BLD AUTO: 0.05 THOUSAND/UL (ref 0–0.2)
IMM GRANULOCYTES NFR BLD AUTO: 1 % (ref 0–2)
LACTATE SERPL-SCNC: 2.1 MMOL/L (ref 0.5–2)
LYMPHOCYTES # BLD AUTO: 1.06 THOUSANDS/ΜL (ref 0.6–4.47)
LYMPHOCYTES NFR BLD AUTO: 13 % (ref 14–44)
MCH RBC QN AUTO: 28 PG (ref 26.8–34.3)
MCHC RBC AUTO-ENTMCNC: 32.4 G/DL (ref 31.4–37.4)
MCV RBC AUTO: 86 FL (ref 82–98)
MONOCYTES # BLD AUTO: 0.78 THOUSAND/ΜL (ref 0.17–1.22)
MONOCYTES NFR BLD AUTO: 10 % (ref 4–12)
NEUTROPHILS # BLD AUTO: 6.03 THOUSANDS/ΜL (ref 1.85–7.62)
NEUTS SEG NFR BLD AUTO: 76 % (ref 43–75)
NRBC BLD AUTO-RTO: 0 /100 WBCS
PLATELET # BLD AUTO: 217 THOUSANDS/UL (ref 149–390)
PMV BLD AUTO: 10.2 FL (ref 8.9–12.7)
POTASSIUM SERPL-SCNC: 4.1 MMOL/L (ref 3.5–5.3)
PROT SERPL-MCNC: 8.7 G/DL (ref 6.4–8.2)
RBC # BLD AUTO: 4.22 MILLION/UL (ref 3.88–5.62)
SODIUM SERPL-SCNC: 135 MMOL/L (ref 136–145)
WBC # BLD AUTO: 7.97 THOUSAND/UL (ref 4.31–10.16)

## 2018-07-16 PROCEDURE — 84484 ASSAY OF TROPONIN QUANT: CPT | Performed by: EMERGENCY MEDICINE

## 2018-07-16 PROCEDURE — 87040 BLOOD CULTURE FOR BACTERIA: CPT | Performed by: EMERGENCY MEDICINE

## 2018-07-16 PROCEDURE — 83605 ASSAY OF LACTIC ACID: CPT | Performed by: EMERGENCY MEDICINE

## 2018-07-16 PROCEDURE — 94640 AIRWAY INHALATION TREATMENT: CPT

## 2018-07-16 PROCEDURE — 82977 ASSAY OF GGT: CPT | Performed by: INTERNAL MEDICINE

## 2018-07-16 PROCEDURE — 80053 COMPREHEN METABOLIC PANEL: CPT | Performed by: EMERGENCY MEDICINE

## 2018-07-16 PROCEDURE — 36415 COLL VENOUS BLD VENIPUNCTURE: CPT

## 2018-07-16 PROCEDURE — 71046 X-RAY EXAM CHEST 2 VIEWS: CPT

## 2018-07-16 PROCEDURE — 85025 COMPLETE CBC W/AUTO DIFF WBC: CPT | Performed by: EMERGENCY MEDICINE

## 2018-07-16 PROCEDURE — 83880 ASSAY OF NATRIURETIC PEPTIDE: CPT | Performed by: EMERGENCY MEDICINE

## 2018-07-16 RX ORDER — ALBUTEROL SULFATE 2.5 MG/3ML
5 SOLUTION RESPIRATORY (INHALATION) ONCE
Status: COMPLETED | OUTPATIENT
Start: 2018-07-16 | End: 2018-07-16

## 2018-07-16 RX ADMIN — ALBUTEROL SULFATE 5 MG: 2.5 SOLUTION RESPIRATORY (INHALATION) at 23:33

## 2018-07-17 PROBLEM — R05.9 COUGH: Status: ACTIVE | Noted: 2018-07-17

## 2018-07-17 PROBLEM — R53.83 FATIGUE: Status: ACTIVE | Noted: 2018-07-17

## 2018-07-17 PROBLEM — R06.02 SOB (SHORTNESS OF BREATH): Status: ACTIVE | Noted: 2018-07-17

## 2018-07-17 PROBLEM — R42 DIZZINESS: Status: ACTIVE | Noted: 2018-07-17

## 2018-07-17 LAB
ADENOVIRUS: NOT DETECTED
ANION GAP SERPL CALCULATED.3IONS-SCNC: 9 MMOL/L (ref 4–13)
BACTERIA UR QL AUTO: ABNORMAL /HPF
BASOPHILS # BLD AUTO: 0.05 THOUSANDS/ΜL (ref 0–0.1)
BASOPHILS NFR BLD AUTO: 1 % (ref 0–1)
BILIRUB UR QL STRIP: NEGATIVE
BUN SERPL-MCNC: 41 MG/DL (ref 5–25)
C PNEUM DNA SPEC QL NAA+PROBE: NOT DETECTED
CALCIUM SERPL-MCNC: 8.3 MG/DL (ref 8.3–10.1)
CHLORIDE SERPL-SCNC: 109 MMOL/L (ref 100–108)
CLARITY UR: ABNORMAL
CO2 SERPL-SCNC: 18 MMOL/L (ref 21–32)
COLOR UR: YELLOW
CREAT SERPL-MCNC: 3.34 MG/DL (ref 0.6–1.3)
CREAT UR-MCNC: 112 MG/DL
EOSINOPHIL # BLD AUTO: 0.01 THOUSAND/ΜL (ref 0–0.61)
EOSINOPHIL NFR BLD AUTO: 0 % (ref 0–6)
ERYTHROCYTE [DISTWIDTH] IN BLOOD BY AUTOMATED COUNT: 13.9 % (ref 11.6–15.1)
EST. AVERAGE GLUCOSE BLD GHB EST-MCNC: 174 MG/DL
FLUAV H1 RNA SPEC QL NAA+PROBE: NOT DETECTED
FLUAV H3 RNA SPEC QL NAA+PROBE: NOT DETECTED
FLUAV RNA SPEC QL NAA+PROBE: NOT DETECTED
FLUBV RNA SPEC QL NAA+PROBE: NOT DETECTED
GFR SERPL CREATININE-BSD FRML MDRD: 16 ML/MIN/1.73SQ M
GGT SERPL-CCNC: 10 U/L (ref 5–85)
GLUCOSE SERPL-MCNC: 114 MG/DL (ref 65–140)
GLUCOSE SERPL-MCNC: 274 MG/DL (ref 65–140)
GLUCOSE SERPL-MCNC: 307 MG/DL (ref 65–140)
GLUCOSE SERPL-MCNC: 311 MG/DL (ref 65–140)
GLUCOSE SERPL-MCNC: 352 MG/DL (ref 65–140)
GLUCOSE SERPL-MCNC: 53 MG/DL (ref 65–140)
GLUCOSE SERPL-MCNC: 95 MG/DL (ref 65–140)
GLUCOSE UR STRIP-MCNC: NEGATIVE MG/DL
HBA1C MFR BLD: 7.7 % (ref 4.2–6.3)
HBOV DNA SPEC QL NAA+PROBE: NOT DETECTED
HCOV 229E RNA SPEC QL NAA+PROBE: NOT DETECTED
HCOV HKU1 RNA SPEC QL NAA+PROBE: NOT DETECTED
HCOV NL63 RNA SPEC QL NAA+PROBE: NOT DETECTED
HCOV OC43 RNA SPEC QL NAA+PROBE: NOT DETECTED
HCT VFR BLD AUTO: 32.2 % (ref 36.5–49.3)
HGB BLD-MCNC: 10.3 G/DL (ref 12–17)
HGB UR QL STRIP.AUTO: ABNORMAL
HPIV1 RNA SPEC QL NAA+PROBE: NOT DETECTED
HPIV2 RNA SPEC QL NAA+PROBE: NOT DETECTED
HPIV3 RNA SPEC QL NAA+PROBE: NOT DETECTED
HPIV4 RNA SPEC QL NAA+PROBE: NOT DETECTED
HYALINE CASTS #/AREA URNS LPF: ABNORMAL /LPF
IMM GRANULOCYTES # BLD AUTO: 0.04 THOUSAND/UL (ref 0–0.2)
IMM GRANULOCYTES NFR BLD AUTO: 0 % (ref 0–2)
KETONES UR STRIP-MCNC: NEGATIVE MG/DL
L PNEUMO1 AG UR QL IA.RAPID: NEGATIVE
LACTATE SERPL-SCNC: 1.9 MMOL/L (ref 0.5–2)
LEUKOCYTE ESTERASE UR QL STRIP: ABNORMAL
LYMPHOCYTES # BLD AUTO: 1.84 THOUSANDS/ΜL (ref 0.6–4.47)
LYMPHOCYTES NFR BLD AUTO: 20 % (ref 14–44)
M PNEUMO DNA SPEC QL NAA+PROBE: NOT DETECTED
MCH RBC QN AUTO: 27.7 PG (ref 26.8–34.3)
MCHC RBC AUTO-ENTMCNC: 32 G/DL (ref 31.4–37.4)
MCV RBC AUTO: 87 FL (ref 82–98)
METAPNEUMOVIRUS: NOT DETECTED
MONOCYTES # BLD AUTO: 0.98 THOUSAND/ΜL (ref 0.17–1.22)
MONOCYTES NFR BLD AUTO: 11 % (ref 4–12)
NEUTROPHILS # BLD AUTO: 6.15 THOUSANDS/ΜL (ref 1.85–7.62)
NEUTS SEG NFR BLD AUTO: 68 % (ref 43–75)
NITRITE UR QL STRIP: NEGATIVE
NON-SQ EPI CELLS URNS QL MICRO: ABNORMAL /HPF
NRBC BLD AUTO-RTO: 0 /100 WBCS
NT-PROBNP SERPL-MCNC: 479 PG/ML
PH UR STRIP.AUTO: 6.5 [PH] (ref 4.5–8)
PHOSPHATE SERPL-MCNC: 2.9 MG/DL (ref 2.3–4.1)
PLATELET # BLD AUTO: 196 THOUSANDS/UL (ref 149–390)
PMV BLD AUTO: 10.5 FL (ref 8.9–12.7)
POTASSIUM SERPL-SCNC: 3.9 MMOL/L (ref 3.5–5.3)
PROCALCITONIN SERPL-MCNC: 0.59 NG/ML
PROT UR STRIP-MCNC: ABNORMAL MG/DL
PROT UR-MCNC: 71 MG/DL
PROT/CREAT UR: 0.63 MG/G{CREAT} (ref 0–0.1)
PTH-INTACT SERPL-MCNC: 100.1 PG/ML (ref 18.4–80.1)
RBC # BLD AUTO: 3.72 MILLION/UL (ref 3.88–5.62)
RBC #/AREA URNS AUTO: ABNORMAL /HPF
RHINOVIRUS RNA SPEC QL NAA+PROBE: NOT DETECTED
RSV A RNA SPEC QL NAA+PROBE: NOT DETECTED
RSV B RNA SPEC QL NAA+PROBE: NOT DETECTED
S PNEUM AG UR QL: NEGATIVE
SODIUM SERPL-SCNC: 136 MMOL/L (ref 136–145)
SP GR UR STRIP.AUTO: 1.01 (ref 1–1.03)
TROPONIN I SERPL-MCNC: <0.02 NG/ML
UROBILINOGEN UR QL STRIP.AUTO: 0.2 E.U./DL
WBC # BLD AUTO: 9.07 THOUSAND/UL (ref 4.31–10.16)
WBC #/AREA URNS AUTO: ABNORMAL /HPF

## 2018-07-17 PROCEDURE — 99220 PR INITIAL OBSERVATION CARE/DAY 70 MINUTES: CPT | Performed by: INTERNAL MEDICINE

## 2018-07-17 PROCEDURE — 36415 COLL VENOUS BLD VENIPUNCTURE: CPT | Performed by: EMERGENCY MEDICINE

## 2018-07-17 PROCEDURE — 84100 ASSAY OF PHOSPHORUS: CPT | Performed by: INTERNAL MEDICINE

## 2018-07-17 PROCEDURE — 84145 PROCALCITONIN (PCT): CPT | Performed by: INTERNAL MEDICINE

## 2018-07-17 PROCEDURE — 84156 ASSAY OF PROTEIN URINE: CPT | Performed by: INTERNAL MEDICINE

## 2018-07-17 PROCEDURE — 83605 ASSAY OF LACTIC ACID: CPT | Performed by: EMERGENCY MEDICINE

## 2018-07-17 PROCEDURE — 82570 ASSAY OF URINE CREATININE: CPT | Performed by: INTERNAL MEDICINE

## 2018-07-17 PROCEDURE — 82948 REAGENT STRIP/BLOOD GLUCOSE: CPT

## 2018-07-17 PROCEDURE — 85025 COMPLETE CBC W/AUTO DIFF WBC: CPT | Performed by: INTERNAL MEDICINE

## 2018-07-17 PROCEDURE — 80048 BASIC METABOLIC PNL TOTAL CA: CPT | Performed by: INTERNAL MEDICINE

## 2018-07-17 PROCEDURE — 81001 URINALYSIS AUTO W/SCOPE: CPT | Performed by: INTERNAL MEDICINE

## 2018-07-17 PROCEDURE — 83036 HEMOGLOBIN GLYCOSYLATED A1C: CPT | Performed by: INTERNAL MEDICINE

## 2018-07-17 PROCEDURE — 87040 BLOOD CULTURE FOR BACTERIA: CPT | Performed by: INTERNAL MEDICINE

## 2018-07-17 PROCEDURE — 83970 ASSAY OF PARATHORMONE: CPT | Performed by: INTERNAL MEDICINE

## 2018-07-17 PROCEDURE — 87633 RESP VIRUS 12-25 TARGETS: CPT | Performed by: INTERNAL MEDICINE

## 2018-07-17 PROCEDURE — 99285 EMERGENCY DEPT VISIT HI MDM: CPT

## 2018-07-17 PROCEDURE — 87449 NOS EACH ORGANISM AG IA: CPT | Performed by: INTERNAL MEDICINE

## 2018-07-17 RX ORDER — INSULIN GLARGINE 100 [IU]/ML
45 INJECTION, SOLUTION SUBCUTANEOUS
Status: DISCONTINUED | OUTPATIENT
Start: 2018-07-17 | End: 2018-07-17

## 2018-07-17 RX ORDER — SODIUM CHLORIDE 9 MG/ML
50 INJECTION, SOLUTION INTRAVENOUS CONTINUOUS
Status: DISCONTINUED | OUTPATIENT
Start: 2018-07-17 | End: 2018-07-17

## 2018-07-17 RX ORDER — SERTRALINE HYDROCHLORIDE 25 MG/1
25 TABLET, FILM COATED ORAL DAILY
Status: DISCONTINUED | OUTPATIENT
Start: 2018-07-17 | End: 2018-07-18 | Stop reason: HOSPADM

## 2018-07-17 RX ORDER — SODIUM CHLORIDE, SODIUM GLUCONATE, SODIUM ACETATE, POTASSIUM CHLORIDE, MAGNESIUM CHLORIDE, SODIUM PHOSPHATE, DIBASIC, AND POTASSIUM PHOSPHATE .53; .5; .37; .037; .03; .012; .00082 G/100ML; G/100ML; G/100ML; G/100ML; G/100ML; G/100ML; G/100ML
50 INJECTION, SOLUTION INTRAVENOUS CONTINUOUS
Status: DISCONTINUED | OUTPATIENT
Start: 2018-07-17 | End: 2018-07-18 | Stop reason: HOSPADM

## 2018-07-17 RX ORDER — SODIUM BICARBONATE 650 MG/1
1300 TABLET ORAL 3 TIMES DAILY
Status: DISCONTINUED | OUTPATIENT
Start: 2018-07-17 | End: 2018-07-18 | Stop reason: HOSPADM

## 2018-07-17 RX ORDER — CLOTRIMAZOLE 1 %
CREAM (GRAM) TOPICAL 2 TIMES DAILY
Status: DISCONTINUED | OUTPATIENT
Start: 2018-07-17 | End: 2018-07-17

## 2018-07-17 RX ORDER — FAMOTIDINE 20 MG/1
20 TABLET, FILM COATED ORAL 2 TIMES DAILY
Status: DISCONTINUED | OUTPATIENT
Start: 2018-07-17 | End: 2018-07-18

## 2018-07-17 RX ORDER — BENZONATATE 100 MG/1
100 CAPSULE ORAL 3 TIMES DAILY
Status: DISCONTINUED | OUTPATIENT
Start: 2018-07-17 | End: 2018-07-18 | Stop reason: HOSPADM

## 2018-07-17 RX ORDER — INSULIN GLARGINE 100 [IU]/ML
45 INJECTION, SOLUTION SUBCUTANEOUS EVERY MORNING
Status: DISCONTINUED | OUTPATIENT
Start: 2018-07-17 | End: 2018-07-18 | Stop reason: HOSPADM

## 2018-07-17 RX ORDER — HEPARIN SODIUM 5000 [USP'U]/ML
5000 INJECTION, SOLUTION INTRAVENOUS; SUBCUTANEOUS EVERY 8 HOURS SCHEDULED
Status: DISCONTINUED | OUTPATIENT
Start: 2018-07-17 | End: 2018-07-18 | Stop reason: HOSPADM

## 2018-07-17 RX ORDER — METOCLOPRAMIDE 5 MG/1
5 TABLET ORAL
Status: DISCONTINUED | OUTPATIENT
Start: 2018-07-17 | End: 2018-07-18 | Stop reason: HOSPADM

## 2018-07-17 RX ORDER — PROMETHAZINE HYDROCHLORIDE 6.25 MG/5ML
12.5 SYRUP ORAL 2 TIMES DAILY
Status: DISCONTINUED | OUTPATIENT
Start: 2018-07-17 | End: 2018-07-17

## 2018-07-17 RX ORDER — ALBUTEROL SULFATE 2.5 MG/3ML
5 SOLUTION RESPIRATORY (INHALATION) ONCE
Status: DISCONTINUED | OUTPATIENT
Start: 2018-07-17 | End: 2018-07-17

## 2018-07-17 RX ORDER — MELATONIN
1000 DAILY
Status: DISCONTINUED | OUTPATIENT
Start: 2018-07-17 | End: 2018-07-17

## 2018-07-17 RX ADMIN — SODIUM CHLORIDE, SODIUM GLUCONATE, SODIUM ACETATE, POTASSIUM CHLORIDE, MAGNESIUM CHLORIDE, SODIUM PHOSPHATE, DIBASIC, AND POTASSIUM PHOSPHATE 50 ML/HR: .53; .5; .37; .037; .03; .012; .00082 INJECTION, SOLUTION INTRAVENOUS at 14:51

## 2018-07-17 RX ADMIN — HEPARIN SODIUM 5000 UNITS: 5000 INJECTION, SOLUTION INTRAVENOUS; SUBCUTANEOUS at 06:38

## 2018-07-17 RX ADMIN — PROMETHAZINE HYDROCHLORIDE 12.5 MG: 6.25 SOLUTION ORAL at 08:33

## 2018-07-17 RX ADMIN — CLOTRIMAZOLE: 10 CREAM TOPICAL at 08:32

## 2018-07-17 RX ADMIN — BENZONATATE 100 MG: 100 CAPSULE ORAL at 17:16

## 2018-07-17 RX ADMIN — INSULIN LISPRO 4 UNITS: 100 INJECTION, SOLUTION INTRAVENOUS; SUBCUTANEOUS at 17:17

## 2018-07-17 RX ADMIN — SODIUM CHLORIDE 125 ML/HR: 0.9 INJECTION, SOLUTION INTRAVENOUS at 03:08

## 2018-07-17 RX ADMIN — INSULIN LISPRO 4 UNITS: 100 INJECTION, SOLUTION INTRAVENOUS; SUBCUTANEOUS at 06:38

## 2018-07-17 RX ADMIN — BENZONATATE 100 MG: 100 CAPSULE ORAL at 03:08

## 2018-07-17 RX ADMIN — METOCLOPRAMIDE 5 MG: 5 TABLET ORAL at 17:16

## 2018-07-17 RX ADMIN — BENZONATATE 100 MG: 100 CAPSULE ORAL at 21:53

## 2018-07-17 RX ADMIN — FAMOTIDINE 20 MG: 20 TABLET ORAL at 17:16

## 2018-07-17 RX ADMIN — INSULIN LISPRO 12 UNITS: 100 INJECTION, SOLUTION INTRAVENOUS; SUBCUTANEOUS at 17:16

## 2018-07-17 RX ADMIN — SODIUM BICARBONATE 650 MG TABLET 1300 MG: at 21:53

## 2018-07-17 RX ADMIN — SODIUM CHLORIDE 125 ML/HR: 0.9 INJECTION, SOLUTION INTRAVENOUS at 04:07

## 2018-07-17 RX ADMIN — INSULIN GLARGINE 45 UNITS: 100 INJECTION, SOLUTION SUBCUTANEOUS at 14:51

## 2018-07-17 RX ADMIN — HEPARIN SODIUM 5000 UNITS: 5000 INJECTION, SOLUTION INTRAVENOUS; SUBCUTANEOUS at 14:55

## 2018-07-17 RX ADMIN — SODIUM BICARBONATE 650 MG TABLET 1300 MG: at 08:33

## 2018-07-17 RX ADMIN — SODIUM BICARBONATE 650 MG TABLET 1300 MG: at 17:16

## 2018-07-17 RX ADMIN — INSULIN LISPRO 6 UNITS: 100 INJECTION, SOLUTION INTRAVENOUS; SUBCUTANEOUS at 11:31

## 2018-07-17 RX ADMIN — SERTRALINE HYDROCHLORIDE 25 MG: 25 TABLET ORAL at 08:35

## 2018-07-17 RX ADMIN — VITAMIN D, TAB 1000IU (100/BT) 1000 UNITS: 25 TAB at 08:35

## 2018-07-17 RX ADMIN — HEPARIN SODIUM 5000 UNITS: 5000 INJECTION, SOLUTION INTRAVENOUS; SUBCUTANEOUS at 21:53

## 2018-07-17 RX ADMIN — BENZONATATE 100 MG: 100 CAPSULE ORAL at 08:35

## 2018-07-17 RX ADMIN — INSULIN LISPRO 12 UNITS: 100 INJECTION, SOLUTION INTRAVENOUS; SUBCUTANEOUS at 14:52

## 2018-07-17 NOTE — SOCIAL WORK
Met with pt at bedside with  Ulysses Soto #[id number]  Pt states he resides in a second floor apt with a flight of steps  Pt uses a cane for ambulation  Pt still driving  Pt has never had home care or been to rehab  Pt does not have a POA or living will  Pts primary contact is his wife Trey Morris [phone]  Pt denies mental illness or substance abuse  Pt [facility] and [address] [place] [address] [address] in [place]  CM will follow for dc needs  CM reviewed d/c planning process including the following: identifying help at home, patient preference for d/c planning needs, Discharge Lounge, [facility] Meds to Bed program, availability of treatment team to discuss questions or concerns patient and/or family may have regarding understanding medications and recognizing signs and symptoms once discharged  CM also encouraged patient to follow up with all recommended appointments after discharge  Patient advised of importance for patient and family to participate in managing patients medical well being  Patient/caregiver received discharge checklist  Content reviewed  Patient/caregiver encouraged to participate in discharge plan of care prior to discharge home

## 2018-07-17 NOTE — ED PROVIDER NOTES
History  Chief Complaint   Patient presents with    Shortness of Breath     pt reports SOB for a couple days with chills at night  denies CP, reports recent cough and generalzed weakness     71-year-old male presenting to the emergency department for evaluation of shortness of breath  He has had several days of progressively worsening dyspnea on exertion associated with some night chills and sweats  Nonproductive cough  He has some chest discomfort with cough only  Also endorses some nausea without vomiting  Has well as generalized malaise  States that it feels very similar to an episode of pneumonia he had about 2 years ago and in fact had an albuterol nebulizer that was left over from that which he used improved his symptoms  Prior to Admission Medications   Prescriptions Last Dose Informant Patient Reported? Taking?    Blood Glucose Monitoring Suppl (FREESTYLE FREEDOM LITE) w/Device KIT  Self Yes Yes   Sig: by Does not apply route 3 (three) times a day   Insulin Glargine (TOUJEO SOLOSTAR) injection pen 300 units/mL  Self Yes Yes   Sig: Inject 45 Units under the skin daily at bedtime    Insulin Pen Needle (B-D UF III MINI PEN NEEDLES) 31G X 5 MM MISC  Self No Yes   Sig: Inject under the skin 4 (four) times a day with insulin   Lancets (FREESTYLE) lancets  Self Yes Yes   Sig: by Does not apply route   NOVOLOG FLEXPEN 100 units/mL injection pen   No Yes   Sig: inject 12 units subcutaneously three times a day with meals   Phenylephrine-DM-GG-APAP (DELSYM COUGH/COLD DAYTIME) 5--325 MG/10ML LIQD  Self No Yes   Sig: Take 1 Dose by mouth 2 (two) times a day as needed (cough)   albuterol (5 mg/mL) 0 5 % nebulizer solution  Self Yes Yes   Sig: Inhale   aspirin 81 MG tablet  Self No Yes   Sig: Take 1 tablet (81 mg total) by mouth daily   cholecalciferol (VITAMIN D3) 1,000 units tablet  Self No Yes   Sig: Take 1 tablet (1,000 Units total) by mouth daily   clotrimazole (LOTRIMIN) 1 % cream   No Yes Sig: Apply topically 2 (two) times a day   sertraline (ZOLOFT) 25 mg tablet  Self No Yes   Sig: Take 1 tablet (25 mg total) by mouth daily   sodium bicarbonate 650 mg tablet  Self Yes Yes   Sig: Take 2 tablets by mouth 3 (three) times a day      Facility-Administered Medications: None       Past Medical History:   Diagnosis Date    Asthma     Cancer ([facility] )     prostate    Diabetes mellitus ([facility] )     UTI (urinary tract infection)     RESOLVED 18 DEC 2012       Past Surgical History:   Procedure Laterality Date    APPENDECTOMY      BLADDER SURGERY      PROSTATE SURGERY         Family History   Problem Relation Age of Onset    Diabetes Mother         TYPE2    Diabetes Brother     Diabetes Family     Heart disease Family     Diabetes Daughter      I have reviewed and agree with the history as documented  Social History   Substance Use Topics    Smoking status: Never Smoker    Smokeless tobacco: Never Used      Comment: 2ND HAND SMOKE EXPOSURE    Alcohol use No      Comment: "Hx of social alcohol use"         Review of Systems   Constitutional: Positive for activity change, appetite change and chills  Negative for fatigue and fever  HENT: Negative for sneezing and sore throat  Eyes: Negative for visual disturbance  Respiratory: Positive for choking, chest tightness and shortness of breath  Negative for cough and wheezing  Cardiovascular: Negative for chest pain and palpitations  Gastrointestinal: Positive for nausea  Negative for abdominal pain, constipation, diarrhea and vomiting  Genitourinary: Negative for difficulty urinating and dysuria  Neurological: Negative for dizziness, weakness, light-headedness, numbness and headaches  All other systems reviewed and are negative        Physical Exam  ED Triage Vitals   Temperature Pulse Respirations Blood Pressure SpO2   07/16/18 2145 07/16/18 2145 07/16/18 2145 07/16/18 2145 07/16/18 2145   99 7 °F (37 6 °C) 96 20 135/71 99 %      Temp Source Heart Rate Source Patient Position - Orthostatic VS BP Location FiO2 (%)   07/16/18 2145 07/16/18 2246 07/16/18 2246 07/16/18 2246 --   Oral Monitor Lying Right arm       Pain Score       07/16/18 2145       No Pain           Orthostatic Vital Signs  Vitals:    07/17/18 1604 07/17/18 2339 07/18/18 0750 07/18/18 1524   BP: 154/78 139/65 131/62 128/65   Pulse: 88 81 74 67   Patient Position - Orthostatic VS: Lying Lying Lying Lying       Physical Exam   Constitutional: He is oriented to person, place, and time  He appears well-developed and well-nourished  No distress  HENT:   Head: Normocephalic and atraumatic  Mouth/Throat: Oropharynx is clear and moist    Eyes: EOM are normal  Pupils are equal, round, and reactive to light  Neck: No JVD present  No tracheal deviation present  Cardiovascular: Normal rate, regular rhythm, normal heart sounds and intact distal pulses  Exam reveals no gallop and no friction rub  No murmur heard  Pulmonary/Chest: Effort normal and breath sounds normal  No respiratory distress  He has no wheezes  He has no rales  Abdominal: Soft  Bowel sounds are normal  He exhibits no distension  There is no tenderness  There is no rebound and no guarding  Neurological: He is alert and oriented to person, place, and time  No cranial nerve deficit  He exhibits normal muscle tone  Skin: Skin is warm and dry  He is not diaphoretic  No pallor  Psychiatric: He has a normal mood and affect  His behavior is normal    Nursing note and vitals reviewed  ED Medications  Medications   albuterol inhalation solution 5 mg (5 mg Nebulization Given 7/16/18 2333)       Diagnostic Studies  Results Reviewed     Procedure Component Value Units Date/Time    Blood culture #1 [[id number]] Collected:  07/16/18 2250    Lab Status:  Preliminary result Specimen:  Blood from Arm, Right Updated:  07/19/18 0801     Blood Culture No Growth at 48 hrs      Blood culture [[id number]] Collected:  07/17/18 0251 Lab Status:  Preliminary result Specimen:  Blood from Arm, Left Updated:  07/19/18 0801     Blood Culture No Growth at 48 hrs  Amanda Hill [[id number]]  (Normal) Collected:  07/16/18 2249    Lab Status:  Final result Specimen:  Blood from Arm, Right Updated:  07/17/18 0809     GGT 10 U/L     Narrative:       Test processed from 7/17/18 @ [id number] specimen    Lactic acid x2 [[id number]]  (Normal) Collected:  07/17/18 0052    Lab Status:  Final result Specimen:  Blood from Arm, Right Updated:  07/17/18 0116     LACTIC ACID 1 9 mmol/L     Narrative:         Result may be elevated if tourniquet was used during collection  NT-BNP PRO [[id number]]  (Abnormal) Collected:  07/16/18 2249    Lab Status:  Final result Specimen:  Blood from Arm, Right Updated:  07/17/18 0008     NT-proBNP 479 (H) pg/mL     Troponin I [[id number]]  (Normal) Collected:  07/16/18 2336    Lab Status:  Final result Specimen:  Blood from Arm, Right Updated:  07/17/18 0002     Troponin I <0 02 ng/mL     Lactic acid x2 [[id number]]  (Abnormal) Collected:  07/16/18 2249    Lab Status:  Final result Specimen:  Blood from Arm, Right Updated:  07/16/18 2348     LACTIC ACID 2 1 (HH) mmol/L     Narrative:         Result may be elevated if tourniquet was used during collection      Comprehensive metabolic panel [[id number]]  (Abnormal) Collected:  07/16/18 2249    Lab Status:  Final result Specimen:  Blood from Arm, Right Updated:  07/16/18 2333     Sodium 135 (L) mmol/L      Potassium 4 1 mmol/L      Chloride 107 mmol/L      CO2 18 (L) mmol/L      Anion Gap 10 mmol/L      BUN 43 (H) mg/dL      Creatinine 3 46 (H) mg/dL      Glucose 336 (H) mg/dL      Calcium 8 7 mg/dL      AST 6 U/L      ALT 15 U/L      Alkaline Phosphatase 158 (H) U/L      Total Protein 8 7 (H) g/dL      Albumin 3 4 (L) g/dL      Total Bilirubin 0 34 mg/dL      eGFR 16 ml/min/1 73sq m     Narrative:         National Kidney Disease Education Program recommendations are as follows:  GFR calculation is accurate only with a steady state creatinine  Chronic Kidney disease less than 60 ml/min/1 73 sq  meters  Kidney failure less than 15 ml/min/1 73 sq  meters  CBC and differential [[id number]]  (Abnormal) Collected:  07/16/18 2248    Lab Status:  Final result Specimen:  Blood from Arm, Right Updated:  07/16/18 2306     WBC 7 97 Thousand/uL      RBC 4 22 Million/uL      Hemoglobin 11 8 (L) g/dL      Hematocrit 36 4 (L) %      MCV 86 fL      MCH 28 0 pg      MCHC 32 4 g/dL      RDW 14 1 %      MPV 10 2 fL      Platelets 143 Thousands/uL      nRBC 0 /100 WBCs      Neutrophils Relative 76 (H) %      Immat GRANS % 1 %      Lymphocytes Relative 13 (L) %      Monocytes Relative 10 %      Eosinophils Relative 0 %      Basophils Relative 0 %      Neutrophils Absolute 6 03 Thousands/µL      Immature Grans Absolute 0 05 Thousand/uL      Lymphocytes Absolute 1 06 Thousands/µL      Monocytes Absolute 0 78 Thousand/µL      Eosinophils Absolute 0 02 Thousand/µL      Basophils Absolute 0 03 Thousands/µL                  X-ray chest 2 views   ED Interpretation by Phyllis Wilson MD (07/16 2339)   Increase in vascular congestion as well as regina interstitial fluid noted  Final Result by Maynor Miles MD (26/85 5307)      No acute cardiopulmonary disease  Workstation performed: [id number]               Procedures  Procedures      Phone Consults  ED Phone Contact    ED Course  ED Course as of Jul 19 2310 Tue Jul 17, 2018   0001 More likely cardiogenic, drawn by 1st nurse protocol LACTIC ACID: (!!) 2 1           Identification of Seniors at Risk      Most Recent Value   (ISAR) Identification of Seniors at Risk   Before the illness or injury that brought you to the Emergency, did you need someone to help you on a regular basis? 1 Filed at: 07/16/2018 2147   In the last 24 hours, have you needed more help than usual?  1 Filed at: 07/16/2018 2147   Have you been hospitalized for one or more nights during the past 6 months? 0 Filed at: 07/16/2018 2147   In general, do you see well? 1 Filed at: 07/16/2018 2147   In general, do you have serious problems with your memory? 0 Filed at: 07/16/2018 2147   Do you take more than three different medications every day? 1 Filed at: 07/16/2018 2147   ISAR Score  4 Filed at: 07/16/2018 2147                          MDM  Number of Diagnoses or Management Options  RIZWANA (acute kidney injury) [facility]):   Shortness of breath:   Diagnosis management comments: 81-year-old male presenting to the emergency department for evaluation of dyspnea on exertion as well as some mild orthopnea and generalized malaise  Will obtain cardiac workup including BMP, chest x-ray, check lactic acid and blood cultures as this is also potentially infectious related  Given his general deconditioning and shortness of breath on exertion with likely recommend inpatient treatment at this time    CritCare Time    Disposition  Final diagnoses:   Shortness of breath   RIZWANA (acute kidney injury) ([facility] [address] [facility] )     Time reflects when diagnosis was documented in both MDM as applicable and the Disposition within this note     Time User Action Codes Description Comment    7/17/2018 12:37 AM Shane Rojas Add [R06 02] Shortness of breath     7/18/2018  4:01 PM Edmar Chery Add [K21 9] GERD (gastroesophageal reflux disease)     7/18/2018  4:10 PM Masha Ho Add [F32 9] Depression     7/18/2018  4:10 PM MahSavannah monka Modify [F32 9] Depression     7/18/2018  4:11 PM MahidSavannaha Modify [R06 02] Shortness of breath     7/18/2018  4:11 PM MahMasha monk Add [N17 9] RIZWANA (acute kidney injury) [facility])       ED Disposition     ED Disposition Condition Comment    Admit  Case was discussed with SOD and the patient's admission status was agreed to be Admission Status: inpatient status to the service of Dr Pina Thomas           Follow-up Information     Follow up With Specialties Details Why Dwight Collins MD Internal Medicine Go on 8/2/2018 at 8 am with Dr Sandra Arm  [facility]   [address] T[place], DO Nephrology Go on 7/27/2018 at 10 am with Mukund Villagomez 91  [address]  [phone]            Discharge Medication List as of 7/18/2018  5:19 PM      START taking these medications    Details   famotidine (PEPCID) 20 mg tablet Take 1 tablet (20 mg total) by mouth daily, Starting Thu 7/19/2018, Normal      metoclopramide (REGLAN) 5 mg tablet Take 1 tablet (5 mg total) by mouth 3 (three) times a day before meals, Starting Wed 7/18/2018, Normal         CONTINUE these medications which have CHANGED    Details   sertraline (ZOLOFT) 25 mg tablet Take 1 tablet (25 mg total) by mouth daily, Starting Wed 7/18/2018, Normal         CONTINUE these medications which have NOT CHANGED    Details   albuterol (5 mg/mL) 0 5 % nebulizer solution Inhale, Starting Fri 2/3/2017, Historical Med      aspirin 81 MG tablet Take 1 tablet (81 mg total) by mouth daily, Starting Wed 4/25/2018, Normal      Blood Glucose Monitoring Suppl (FREESTYLE FREEDOM LITE) w/Device KIT by Does not apply route 3 (three) times a day, Starting Tue 3/24/2015, Historical Med      cholecalciferol (VITAMIN D3) 1,000 units tablet Take 1 tablet (1,000 Units total) by mouth daily, Starting Wed 4/25/2018, Normal      clotrimazole (LOTRIMIN) 1 % cream Apply topically 2 (two) times a day, Starting Mon 4/30/2018, Print      Insulin Glargine (TOUJEO SOLOSTAR) injection pen 300 units/mL Inject 45 Units under the skin daily at bedtime , Starting Tue 8/9/2016, Historical Med      Insulin Pen Needle (B-D UF III MINI PEN NEEDLES) 31G X 5 MM MISC Inject under the skin 4 (four) times a day with insulin, Starting Sun 3/18/2018, Normal      Lancets (FREESTYLE) lancets by Does not apply route, Starting Tue 3/24/2015, Historical Med      NOVOLOG FLEXPEN 100 units/mL injection pen inject 12 units subcutaneously three times a day with meals, Normal      sodium bicarbonate 650 mg tablet Take 2 tablets by mouth 3 (three) times a day, Starting Thu 9/21/2017, Historical Med         STOP taking these medications       Phenylephrine-DM-GG-APAP (DELSYM COUGH/COLD DAYTIME) 5--325 MG/10ML LIQD Comments:   Reason for Stopping:               Outpatient Discharge Orders  Basic metabolic panel   Standing Status: Future  Standing Exp  Date: 07/18/19     Discharge Diet     Activity as tolerated         ED Provider  Attending physically available and evaluated Lela Jiménez I managed the patient along with the ED Attending      Electronically Signed by         Sierra Saeed MD  07/19/18 5942

## 2018-07-17 NOTE — ED ATTENDING ATTESTATION
Fabrizio Garcia MD, saw and evaluated the patient  I have discussed the patient with the resident/non-physician practitioner and agree with the resident's/non-physician practitioner's findings, Plan of Care, and MDM as documented in the resident's/non-physician practitioner's note, except where noted  All available labs and Radiology studies were reviewed  At this point I agree with the current assessment done in the Emergency Department  I have conducted an independent evaluation of this patient a history and physical is as follows:    OA: 82 y/o m with HTN, DM and h/o CHF who presents with SOB, ELIZABETH and cough  + 2 days of night sweets  + CP with cough only  + post-tussive nausea without vomiting  Mildly decreased oral intake  No abd or back pain  Sxms similar to previous episode of pneumonia, used an albuterol inhaler at home earlier today with minimal improvement of sxms  No dizziness/headache  No focal numbness/weakness/tingling  PE, elderly m, NAD, VSS, Nc/AT, mildly dry MM, neck supple/FROM, RR, lungs with scattered wheeze, no increased work of breathing, speaks in complete sentences, abd soft, +BS, -r/g, - edema, intact distal pulses, AAO   A/p SOB with cough, labs, imaging, treat accordingly and admit    Critical Care Time  CritCare Time    Procedures

## 2018-07-17 NOTE — H&P
INTERNAL MEDICINE HISTORY AND PHYSICAL  2 218-01 SOD Team A    NAME: Oliva Henley  AGE: 81 y o  SEX: male  : 1935   MRN: [id number]  ENCOUNTER: [id number]    DATE: 2018  TIME: 2:48 AM    Primary Care Physician: No primary care provider on file  Admitting Provider: Wolf Rayo MD    Chief complaint: Cough with shortness of breath    History of Present Illness     Redd Henley is a 81 y o  male with a past medical history significant for asthma, prostate cancer, type 2 diabetes, and depression who presented with symptoms of shortness of breath and cough over the past 2-3 weeks  The cough is productive of clear sputum  He has been using Nyquil with some improvement in symptoms  Additionally, the patient has been experiencing fatigue, and dizziness  The dizziness has been present over the past 4 days, and worse in the mornings  On the morning prior to admission, he almost fell when getting out of the bathtub  The patient has also noticed weight loss, although over months, associated with decreased appetite  He also has been experiencing night terrors as per his wife  Over the past 4 days, he has stopped eating large meals entirely and has been eating primarily fruits  The family also notes that he has been drinking an increased amount of water recently  He has been using his prandial insulin (Novolog 12 U) even when he skips or has small meals  He has a sick contact in his brother, whom has been living at the same home as the patient  That brother was recently diagnosed with an unknown lung infection for which he was treated at [facility]  The patient came into the ED with vital signs within normal limits, and he was saturating at 99% on room air  His CMP showed a low sodium of 135, low bicarb at 18, and an elevated BUN and creatinine of 43 and 3 46 respectively  His baseline creatinine is about 2 75  Lactic acid was 2 1, which decreased to 1 9 after receiving fluids   Troponin was negative, and BNP was 479  CXR showed no consolidations and no significant pulmonary fluid buildup  WBC was normal at 7 97 and hemoglobin was 11 8  His blood glucose was 336, and he was continued on his home glargine 45 U, but switched from his normal 12 U of prandial insulin to a sliding scale  He notes that recently at home is blood sugars have been in the low 100s  When he was seen in the ED by the admitting team, he was lying in bed, and accompanied by his wife and son  He appeared stable but fatigued, and was coughing  He was oriented x3  The patient has a history of depression diagnosed 4/18  When seen in the ED, he denied currently feeling depressed  Review of Systems   Review of Systems   Constitutional: Positive for activity change, appetite change and unexpected weight change  Negative for chills, diaphoresis, fatigue and fever  HENT: Negative  Eyes: Negative  Respiratory: Positive for cough and shortness of breath  Negative for apnea, choking, chest tightness, wheezing and stridor  Gastrointestinal: Negative  Genitourinary: Positive for frequency  Negative for difficulty urinating, dysuria, enuresis and flank pain  Musculoskeletal: Positive for arthralgias and neck pain  Skin: Negative  Allergic/Immunologic: Negative for environmental allergies, food allergies and immunocompromised state  Neurological: Positive for dizziness  Negative for tremors, seizures, syncope, facial asymmetry, speech difficulty, weakness, light-headedness, numbness and headaches  Hematological: Negative  Psychiatric/Behavioral: Negative          Past Medical History     Past Medical History:   Diagnosis Date    Asthma     Cancer ([facility] [address] )     prostate    [facility]     UTI (urinary tract infection)     RESOLVED 18 DEC 2012       Past Surgical History     Past Surgical History:   Procedure Laterality Date    APPENDECTOMY      BLADDER SURGERY      PROSTATE SURGERY         Social History     History Alcohol Use No     Comment: "Hx of social alcohol use"      History   Drug Use No     History   Smoking Status    Never Smoker   Smokeless Tobacco    Never Used     Comment: 2ND HAND SMOKE EXPOSURE       Family History     Family History   Problem Relation Age of Onset    Diabetes Mother         TYPE2    Diabetes Brother     Diabetes Family     Heart disease Family     Diabetes Daughter        Medications Prior to Admission     Prior to Admission medications    Medication Sig Start Date End Date Taking?  Authorizing Provider   albuterol (5 mg/mL) 0 5 % nebulizer solution Inhale 2/3/17  Yes Historical Provider, MD   aspirin 81 MG tablet Take 1 tablet (81 mg total) by mouth daily 4/25/18  Yes Isela Xiong, DO   Blood Glucose Monitoring Suppl (FREESTYLE FREEDOM LITE) w/Device KIT by Does not apply route 3 (three) times a day 3/24/15  Yes Historical Provider, MD   cholecalciferol (VITAMIN D3) 1,000 units tablet Take 1 tablet (1,000 Units total) by mouth daily 4/25/18  Yes Isela Xiong DO   clotrimazole (LOTRIMIN) 1 % cream Apply topically 2 (two) times a day 4/30/18  Yes Olga Abel, DPFEDERICA   Insulin Glargine (TOUJEO SOLOSTAR) injection pen 300 units/mL Inject 45 Units under the skin daily at bedtime  8/9/16  Yes Historical Provider, MD   Insulin Pen Needle (B-D UF III MINI PEN NEEDLES) 31G X 5 MM MISC Inject under the skin 4 (four) times a day with insulin 3/18/18  Yes Evelia Ramirez,    Lancets (FREESTYLE) lancets by Does not apply route 3/24/15  Yes Historical Provider, MD Agapito Pearce 100 units/mL injection pen inject 12 units subcutaneously three times a day with meals 7/13/18  Yes Isela Xiong, DO   Phenylephrine-DM-GG-APAP (DELSYM COUGH/COLD DAYTIME) 5--325 MG/10ML LIQD Take 1 Dose by mouth 2 (two) times a day as needed (cough) 1/30/18  Yes Marlene Dorado, DO   sertraline (ZOLOFT) 25 mg tablet Take 1 tablet (25 mg total) by mouth daily 4/25/18  Yes Isela Xiong DO   sodium bicarbonate 650 mg tablet Take 2 tablets by mouth 3 (three) times a day 9/21/17  Yes Historical Provider, MD   glucose blood (FREESTYLE LITE) test strip by In Vitro route 3 (three) times a day 3/24/15 7/17/18 Yes Historical Provider, MD       Allergies     Allergies   Allergen Reactions    Aspirin Itching       Objective     Vitals:    07/16/18 2145 07/16/18 2246 07/17/18 0200   BP: 135/71 152/66 133/77   BP Location:  Right arm    Pulse: 96 88 92   Resp: 20 20 20   Temp: 99 7 °F (37 6 °C)     TempSrc: Oral     SpO2: 99% 99% 95%   Weight: 81 6 kg (180 lb)       Body mass index is 26 58 kg/m²  No intake or output data in the 24 hours ending 07/17/18 0248  Invasive Devices     Peripheral Intravenous Line            Peripheral IV 07/16/18 Right Antecubital less than 1 day            Physical Exam   Constitutional: He is oriented to person, place, and time  He appears well-developed  No distress  Alert, but appears fatigued   HENT:   Head: Normocephalic and atraumatic  Eyes: Conjunctivae and EOM are normal  Right eye exhibits no discharge  Left eye exhibits no discharge  No scleral icterus  Neck: Normal range of motion  Neck supple  No JVD present  No tracheal deviation present  No thyromegaly present  Cardiovascular: Normal rate, regular rhythm and intact distal pulses  Exam reveals no gallop and no friction rub  No murmur heard  Pulmonary/Chest: Effort normal and breath sounds normal  No respiratory distress  He has no wheezes  He has no rales  He exhibits no tenderness  Abdominal: Soft  Bowel sounds are normal  He exhibits no distension and no mass  There is no tenderness  There is no rebound and no guarding  Musculoskeletal: Normal range of motion  He exhibits no edema, tenderness or deformity  Neurological: He is alert and oriented to person, place, and time  Skin: Skin is warm and dry  No rash noted  He is not diaphoretic  No erythema  No pallor         Lab Results: I have personally reviewed pertinent reports  CBC:   Results from last 7 days  Lab Units 07/16/18  2248   WBC Thousand/uL 7 97   RBC Million/uL 4 22   HEMOGLOBIN g/dL 11 8*   HEMATOCRIT % 36 4*   MCV fL 86   MCH pg 28 0   MCHC g/dL 32 4   RDW % 14 1   MPV fL 10 2   PLATELETS Thousands/uL 217   NRBC AUTO /100 WBCs 0   NEUTROS PCT % 76*   LYMPHS PCT % 13*   MONOS PCT % 10   EOS PCT % 0   BASOS PCT % 0   NEUTROS ABS Thousands/µL 6 03   LYMPHS ABS Thousands/µL 1 06   MONOS ABS Thousand/µL 0 78   EOS ABS Thousand/µL 0 02       Results from last 7 days  Lab Units 07/16/18  2249   SODIUM mmol/L 135*   POTASSIUM mmol/L 4 1   CHLORIDE mmol/L 107   CO2 mmol/L 18*   BUN mg/dL 43*   CREATININE mg/dL 3 46*   CALCIUM mg/dL 8 7   TOTAL PROTEIN g/dL 8 7*   BILIRUBIN TOTAL mg/dL 0 34   ALK PHOS U/L 158*   ALT U/L 15   AST U/L 6   GLUCOSE RANDOM mg/dL 336*         Imaging: I have personally reviewed pertinent films in PACS    Microbiology: cultures obtained in emergency department include blood cultures x2     EKG, Pathology, and Other Studies: I have personally reviewed pertinent reports  Medications given in Emergency Department     Medication Administration - last 24 hours from 07/16/2018 0248 to 07/17/2018 0248       Date/Time Order Dose Route Action Action by     07/16/2018 2333 albuterol inhalation solution 5 mg 5 mg Nebulization Given Griffin Pittman RN          Assessment and Plan     Problem List     H/O prostate cancer    Anemia of chronic disorder    Benign essential hypertension    Cervical spinal stenosis    Overview Signed 1/23/2018  1:03 PM by Kiersten Bacon DO     Transitioned From: Localized Osteoarthritis Of The Neck         Chronic kidney disease, stage IV (severe) ([facility] [address] )    Diastolic dysfunction    DM type 2 (diabetes mellitus, type 2) ([facility] [address] )    Lab Results   Component Value Date    HGBA1C 9 0% 04/25/2018       No results for input(s): POCGLU in the last 72 hours      Blood Sugar Average: Last 72 hrs:          Dyslipidemia Peripheral vascular disease ([facility] )    Vitamin D deficiency    Metabolic acidosis    Other proteinuria    Adhesive bursitis of left shoulder    Overview Signed 4/25/2018  1:08 PM by Omar Rangel DO     Transitioned From: Shoulder joint pain, unspecified laterality; Description: Dermatology referral given         Bladder cancer [facility])    Claudication ([facility] )    Mononeuritis    Diabetic nephropathy ([facility] )    Overview Signed 4/25/2018  1:08 PM by Omar Rangel DO     Transitioned From: Proteinuria         Lab Results   Component Value Date    HGBA1C 9 0% 04/25/2018       No results for input(s): POCGLU in the last 72 hours  Blood Sugar Average: Last 72 hrs:          Seborrheic keratosis    Secondary hyperparathyroidism of renal origin ([facility] )    Depression          1  Coughing with shortness of breath  -Viral URI vs early-stage pneumonia  -CXR showed no signs of infection  -Blood cultures, procalcitonin pending  -Urine legionella and s  pneumo pending  -Respiratory pathogen profile pending  -Droplet precautions  -Benzonatate and promethazine    2  Diabetes  -Last A1c: 9 0 in April 2018  Re-ordered this admission, pending  -On home, was getting 12 U prandial and 45 U glargine at night  -Kept glargine, switched prandial to sliding scale  -Continue to monitor   -CHO controlled diet    3  RIZWANA  -Creatinine baseline 2 75  3 46 this admission  Likely secondary to dehydration   -125ml/hr normal saline    4  Depression  -On Zoloft 25 mg daily outpatient, continue in hospital  -Depression may be contributing to fatigue, loss of appetite in patient     6  Asthma  -Albuterol nebulizer PRN     7  Dizziness  -Orthostatic blood pressures pending    Code Status: Level 1 - Full Code  VTE Pharmacologic Prophylaxis: Heparin   VTE Mechanical Prophylaxis: sequential compression device  Admission Status: OBSERVATION    Admission Time  I spent 30 minutes admitting the patient    This involved direct patient contact where I performed a full history and physical, reviewing previous records, and reviewing laboratory and other diagnostic studies      Teresa Cox MD  Internal Medicine  PGY-1

## 2018-07-17 NOTE — PLAN OF CARE
Problem: Potential for Falls  Goal: Patient will remain free of falls  INTERVENTIONS:  - Assess patient frequently for physical needs  -  Identify cognitive and physical deficits and behaviors that affect risk of falls  -  [place] fall precautions as indicated by assessment   - Educate patient/family on patient safety including physical limitations  - Instruct patient to call for assistance with activity based on assessment  - Modify environment to reduce risk of injury  - Consider OT/PT consult to assist with strengthening/mobility   Outcome: Progressing      Problem: Nutrition/Hydration-ADULT  Goal: Nutrient/Hydration intake appropriate for improving, restoring or maintaining nutritional needs  Monitor and assess patient's nutrition/hydration status for malnutrition (ex- brittle hair, bruises, dry skin, pale skin and conjunctiva, muscle wasting, smooth red tongue, and disorientation)  Collaborate with interdisciplinary team and initiate plan and interventions as ordered  Monitor patient's weight and dietary intake as ordered or per policy  Utilize nutrition screening tool and intervene per policy  Determine patient's food preferences and provide high-protein, high-caloric foods as appropriate       INTERVENTIONS:  - Monitor oral intake, urinary output, labs, and treatment plans  - Assess nutrition and hydration status and recommend course of action  - Evaluate amount of meals eaten  - Assist patient with eating if necessary   - Allow adequate time for meals  - Recommend/ encourage appropriate diets, oral nutritional supplements, and vitamin/mineral supplements  - Order, calculate, and assess calorie counts as needed  - Recommend, monitor, and adjust tube feedings and TPN/PPN based on assessed needs  - Assess need for intravenous fluids  - Provide specific nutrition/hydration education as appropriate  - Include patient/family/caregiver in decisions related to nutrition   Outcome: Progressing      Problem: PAIN - ADULT  Goal: Verbalizes/displays adequate comfort level or baseline comfort level  Interventions:  - Encourage patient to monitor pain and request assistance  - Assess pain using appropriate pain scale  - Administer analgesics based on type and severity of pain and evaluate response  - Implement non-pharmacological measures as appropriate and evaluate response  - Consider cultural and social influences on pain and pain management  - Notify physician/advanced practitioner if interventions unsuccessful or patient reports new pain  Outcome: Progressing      Problem: INFECTION - ADULT  Goal: Absence or prevention of progression during hospitalization  INTERVENTIONS:  - Assess and monitor for signs and symptoms of infection  - Monitor lab/diagnostic results  - Monitor all insertion sites, i e  indwelling lines, tubes, and drains  - Monitor endotracheal (as able) and nasal secretions for changes in amount and color  - [place] appropriate cooling/warming therapies per order  - Administer medications as ordered  - Instruct and encourage patient and family to use good hand hygiene technique  - Identify and instruct in appropriate isolation precautions for identified infection/condition  Outcome: Progressing      Problem: SAFETY ADULT  Goal: Maintain or return to baseline ADL function  INTERVENTIONS:  -  Assess patient's ability to carry out ADLs; assess patient's baseline for ADL function and identify physical deficits which impact ability to perform ADLs (bathing, care of mouth/teeth, toileting, grooming, dressing, etc )  - Assess/evaluate cause of self-care deficits   - Assess range of motion  - Assess patient's mobility; develop plan if impaired  - Assess patient's need for assistive devices and provide as appropriate  - Encourage maximum independence but intervene and supervise when necessary  ¯ Involve family in performance of ADLs  ¯ Assess for home care needs following discharge   ¯ Request OT consult to assist with ADL evaluation and planning for discharge  ¯ Provide patient education as appropriate  Outcome: Progressing    Goal: Maintain or return mobility status to optimal level  INTERVENTIONS:  - Assess patient's baseline mobility status (ambulation, transfers, stairs, etc )    - Identify cognitive and physical deficits and behaviors that affect mobility  - Identify mobility aids required to assist with transfers and/or ambulation (gait belt, sit-to-stand, lift, walker, cane, etc )  - [place] fall precautions as indicated by assessment  - Record patient progress and toleration of activity level on Mobility SBAR; progress patient to next Phase/Stage  - Instruct patient to call for assistance with activity based on assessment  - Request Rehabilitation consult to assist with strengthening/weightbearing, etc   Outcome: Progressing      Problem: DISCHARGE PLANNING  Goal: Discharge to home or other facility with appropriate resources  INTERVENTIONS:  - Identify barriers to discharge w/patient and caregiver  - Arrange for needed discharge resources and transportation as appropriate  - Identify discharge learning needs (meds, wound care, etc )  - Arrange for interpretive services to assist at discharge as needed  - Refer to Case Management Department for coordinating discharge planning if the patient needs post-hospital services based on physician/advanced practitioner order or complex needs related to functional status, cognitive ability, or social support system  Outcome: Progressing      Problem: Knowledge Deficit  Goal: Patient/family/caregiver demonstrates understanding of disease process, treatment plan, medications, and discharge instructions  Complete learning assessment and assess knowledge base    Interventions:  - Provide teaching at level of understanding  - Provide teaching via preferred learning methods  Outcome: Progressing

## 2018-07-17 NOTE — CASE MANAGEMENT
Thank you,  Bryn Aqq  [facility] Utilization Review Department  Phone: [phone]; Fax [phone]  ATTENTION: The Network Utilization Review Department is now centralized for our 9 Facilities  Make a note that we have a new phone and fax numbers for our Department  Please call with any questions or concerns to [phone] and carefully follow the prompts so that you are directed to the right person  All voicemails are confidential  Fax any determinations, approvals, denials, and requests for initial or continue stay review clinical to [phone]  Due to HIGH CALL volume, it would be easier if you could please send faxed requests to expedite your requests and in part, help us provide discharge notifications faster     ==================================================================================    Initial Clinical Review    Admission: Date/Time/Statement: 07/17/2018 @ 0038  Orders Placed This Encounter   Procedures    Place in Observation (expected length of stay for this patient is less than two midnights)     Standing Status:   Standing     Number of Occurrences:   1     Order Specific Question:   Admitting Physician     Answer:   WENCESLAO HOLLAND [[id number]]     Order Specific Question:   Level of Care     Answer:   Med Surg [[id number]]         ED: Date/Time/Mode of Arrival:   ED Arrival Information     Expected Arrival Acuity Means of Arrival Escorted By Service Admission Type    - 7/16/2018 21:42 Urgent Wheelchair Family Member General Medicine Urgent    Arrival Complaint    SOB, WEAKNESS          Chief Complaint:   Chief Complaint   Patient presents with    Shortness of Breath     pt reports SOB for a couple days with chills at night   denies CP, reports recent cough and generalzed weakness       History of Illness:   Kelly Luna is a 81 y o  male with a past medical history significant for asthma, prostate cancer, type 2 diabetes, and depression who presented with symptoms of shortness of breath and cough over the past 2-3 weeks  The cough is productive of clear sputum  He has been using Nyquil with some improvement in symptoms  Additionally, the patient has been experiencing fatigue, and dizziness  The dizziness has been present over the past 4 days, and worse in the mornings  On the morning prior to admission, he almost fell when getting out of the bathtub  The patient has also noticed weight loss, although over months, associated with decreased appetite  He also has been experiencing night terrors as per his wife  Over the past 4 days, he has stopped eating large meals entirely and has been eating primarily fruits  The family also notes that he has been drinking an increased amount of water recently  He has been using his prandial insulin (Novolog 12 U) even when he skips or has small meals  He has a sick contact in his brother, whom has been living at the same home as the patient  That brother was recently diagnosed with an unknown lung infection for which he was treated at [facility]      ED Vital Signs:   ED Triage Vitals   Temperature Pulse Respirations Blood Pressure SpO2   07/16/18 2145 07/16/18 2145 07/16/18 2145 07/16/18 2145 07/16/18 2145   99 7 °F (37 6 °C) 96 20 135/71 99 %      Temp Source Heart Rate Source Patient Position - Orthostatic VS BP Location FiO2 (%)   07/16/18 2145 07/16/18 2246 07/16/18 2246 07/16/18 2246 --   Oral Monitor Lying Right arm       Pain Score       07/16/18 2145       No Pain        Wt Readings from Last 1 Encounters:   07/16/18 81 6 kg (180 lb)     Abnormal Labs/Diagnostic Test Results:   WBC Thousand/uL 7 97   RBC Million/uL 4 22   HEMOGLOBIN g/dL 11 8*   HEMATOCRIT % 36 4*     SODIUM mmol/L 135*   POTASSIUM mmol/L 4 1   CHLORIDE mmol/L 107   CO2 mmol/L 18*   BUN mg/dL 43*   CREATININE mg/dL 3 46*   CALCIUM mg/dL 8 7   TOTAL PROTEIN g/dL 8 7*   BILIRUBIN TOTAL mg/dL 0 34   ALK PHOS U/L 158*   ALT U/L 15   AST U/L 6   GLUCOSE RANDOM mg/dL 336*     NT-proBNP 479 (H) pg/mL     Troponin I <0 02 ng/mL     LACTIC ACID 2 1 (HH) mmol/L     Chest X:  Increase in vascular congestion as well as regina interstitial fluid noted  ED Treatment:   Medication Administration from 07/16/2018 2142 to 07/17/2018 0233    Date/Time Order Dose Route Action   07/16/2018 2333 albuterol inhalation solution 5 mg 5 mg Nebulization Given          Past Medical/Surgical History:    Active Ambulatory Problems     Diagnosis Date Noted    Hx of bladder cancer 11/11/2012    Anemia of chronic disorder 01/23/2018    Benign essential hypertension 02/19/2016    Cervical spinal stenosis 04/10/2014    Chronic kidney disease, stage IV (severe) ([facility] ) 57/84/3713    Diastolic dysfunction 07/53/7759    DM type 2 (diabetes mellitus, type 2) ([facility] ) 01/23/2018    Dyslipidemia 01/23/2018    Peripheral vascular disease ([facility] ) 04/10/2015    Vitamin D deficiency 80/61/0708    Metabolic acidosis 13/67/1856    Other proteinuria 02/14/2018    Adhesive bursitis of left shoulder 03/24/2014    Bladder cancer ([facility] ) 11/30/2012    Claudication ([facility] ) 08/21/2017    Mononeuritis 11/30/2012    Diabetic nephropathy ([facility] ) 07/03/2014    Seborrheic keratosis 04/10/2015    Secondary hyperparathyroidism of renal origin ([facility] ) 09/01/2016    Depression 04/25/2018     Past Medical History:   Diagnosis Date    Asthma     Cancer ([facility] )     Diabetes mellitus ([facility] )     UTI (urinary tract infection)        Admitting Diagnosis: Shortness of breath [R06 02]  SOB (shortness of breath) [R06 02]    Age/Sex: 81 y o  male    Assessment/Plan:  Problem List            H/O prostate cancer      Anemia of chronic disorder      Benign essential hypertension      Cervical spinal stenosis      Overview Signed 1/23/2018  1:03 PM by Kota Madden DO        Transitioned From: Localized Osteoarthritis Of The Neck            Chronic kidney disease, stage IV (severe) (HCC)      Diastolic dysfunction      DM type 2 (diabetes mellitus, type 2) ([facility] )         Lab Results   Component Value Date     HGBA1C 9 0% 04/25/2018         No results for input(s): POCGLU in the last 72 hours      Blood Sugar Average: Last 72 hrs:           Dyslipidemia      Peripheral vascular disease (HCC)      Vitamin D deficiency      Metabolic acidosis      Other proteinuria      Adhesive bursitis of left shoulder      Overview Signed 4/25/2018  1:08 PM by [employer], DO        Transitioned From: Shoulder joint pain, unspecified laterality; Description: Dermatology referral given            Bladder cancer ([facility] )      Claudication ([facility] )      Mononeuritis      Diabetic nephropathy ([place] [address] )      Overview Signed 4/25/2018  1:08 PM by [employer], DO        Transitioned From: Proteinuria                  Lab Results   Component Value Date     HGBA1C 9 0% 04/25/2018           Blood Sugar Average: Last 72 hrs:           Seborrheic keratosis      Secondary hyperparathyroidism of renal origin ([facility] )      Depression              1  Coughing with shortness of breath  -Viral URI vs early-stage pneumonia  -CXR showed no signs of infection  -Blood cultures, procalcitonin pending  -Urine legionella and s  pneumo pending  -Respiratory pathogen profile pending  -Droplet precautions  -Benzonatate and promethazine     2  Diabetes  -Last A1c: 9 0 in April 2018  Re-ordered this admission, pending  -On home, was getting 12 U prandial and 45 U glargine at night  -Kept glargine, switched prandial to sliding scale  -Continue to monitor   -CHO controlled diet     3  RIZWANA  -Creatinine baseline 2 75  3 46 this admission  Likely secondary to dehydration   -125ml/hr normal saline     4  Depression  -On Zoloft 25 mg daily outpatient, continue in hospital  -Depression may be contributing to fatigue, loss of appetite in patient      6   Asthma  -Albuterol nebulizer PRN      7  Dizziness  -Orthostatic blood pressures pending     Code Status: Level 1 - Full Code  VTE Pharmacologic Prophylaxis: Heparin   VTE Mechanical Prophylaxis: sequential compression device  Admission Status: OBSERVATION    Admission Orders:  Scheduled Meds:   Current Facility-Administered Medications:  albuterol 5 mg Nebulization Q6H PRN    benzonatate 100 mg Oral TID    cholecalciferol 1,000 Units Oral Daily    clotrimazole  Topical BID    heparin (porcine) 5,000 Units Subcutaneous Q8H [facility]    insulin glargine 45 Units Subcutaneous HS    insulin lispro 1-6 Units Subcutaneous TID AC    promethazine 12 5 mg Oral BID    sertraline 25 mg Oral Daily    sodium bicarbonate 1,300 mg Oral TID    sodium chloride 125 mL/hr Intravenous Continuous Last Rate: 125 mL/hr (07/17/18 0407)     Continuous Infusions:   sodium chloride 125 mL/hr Last Rate: 125 mL/hr (07/17/18 0407)

## 2018-07-17 NOTE — MEDICAL STUDENT
Nini Muse - 82 yo M    CC: Cough with shortness of breath    HPI:  Mr Gardenia Morris presents with a cough and shortness of breath that worsened about 5-6 days ago  He had URI like symptoms about 6 months ago, which has persisted as a cough that improved but never fully resolved  In addition to the cough, he has had occasional shortness of breath over these 6 months  The cough is exacerbated by talking which leads to an itch in his throat resulting in his cough  There is no chest pain associated with his cough but he has pain in his throat when coughing likely due to irritation from the persistent cough  Patient state cough is non-productive at this time but had been previously coughing up clear sputum  He has had episodes of dizziness over the past six months but he doesn't feel it has changed at all recently  Patient came into the ED last night (7/16) with normal vital signs and high O2 saturation on room air  However, he was coughing and was fatigued  Also states that he has had decreased appetite in the past six months but has been eating a variety of foods  His appetite has furthered decreased during the exacerbation of his cough  Continues to take novolog with meals even when only eating small amounts  He also expressed that there was pain in his LUQ/under his left inferior rib, which was worsened with palpation  Brother who lives with patient was also recently sick with a respiratory infection  Past Medical History:   Diagnosis Date    Asthma     Cancer [facility])     prostate    Diabetes mellitus ([facility]     UTI (urinary tract infection)     RESOLVED 18 DEC 2012     Past Surgical History:   Procedure Laterality Date    APPENDECTOMY      BLADDER SURGERY      PROSTATE SURGERY       The patient has a family history of diabetes in his mother, brother, and daughter      Social History   No history of smoking  Social alcohol use  No history of illicit drug use    Review of Systems:  Recent fatigue and weight loss and decreased appetite  No fever or chills  HENT: Throat pain w/ cough, decreased vision in left eye (chronic) and bilateral hearing loss (chronic)  Respiratory: Cough and SOB  Cardiovascular: No chest pain or palpitations  Neuro: Dizziness  No numbness, tingling, weakness  Psychiatric: Negative  GI: RUQ pain    No current facility-administered medications on file prior to encounter        Current Outpatient Prescriptions on File Prior to Encounter   Medication Sig Dispense Refill    albuterol (5 mg/mL) 0 5 % nebulizer solution Inhale      aspirin 81 MG tablet Take 1 tablet (81 mg total) by mouth daily 90 tablet 2    Blood Glucose Monitoring Suppl (FREESTYLE FREEDOM LITE) w/Device KIT by Does not apply route 3 (three) times a day      cholecalciferol (VITAMIN D3) 1,000 units tablet Take 1 tablet (1,000 Units total) by mouth daily 90 tablet 2    clotrimazole (LOTRIMIN) 1 % cream Apply topically 2 (two) times a day 30 g 4    Insulin Glargine (TOUJEO SOLOSTAR) injection pen 300 units/mL Inject 45 Units under the skin daily at bedtime       Insulin Pen Needle (B-D UF III MINI PEN NEEDLES) 31G X 5 MM MISC Inject under the skin 4 (four) times a day with insulin 400 each 1    Lancets (FREESTYLE) lancets by Does not apply route      NOVOLOG FLEXPEN 100 units/mL injection pen inject 12 units subcutaneously three times a day with meals 15 mL 2    Phenylephrine-DM-GG-APAP (DELSYM COUGH/COLD DAYTIME) 5--325 MG/10ML LIQD Take 1 Dose by mouth 2 (two) times a day as needed (cough) 1 Bottle 0    sertraline (ZOLOFT) 25 mg tablet Take 1 tablet (25 mg total) by mouth daily 90 tablet 0    sodium bicarbonate 650 mg tablet Take 2 tablets by mouth 3 (three) times a day      [DISCONTINUED] glucose blood (FREESTYLE LITE) test strip by In Vitro route 3 (three) times a day       Medication Administration - last 24 hours from 07/16/2018 1109 to 07/17/2018 1109       Date/Time Order Dose Route Action Action by 07/16/2018 2333 albuterol inhalation solution 5 mg 5 mg Nebulization Given Whit Reyes RN     07/17/2018 0813 benzonatate (TESSALON PERLES) capsule 100 mg 100 mg Oral Given Sudeep Vidal RN     07/17/2018 0308 benzonatate (TESSALON PERLES) capsule 100 mg 100 mg Oral Given Chika Hahn RN     07/17/2018 9976 promethazine (PHENERGAN) 12 5 mg/10 mL oral syrup 12 5 mg 12 5 mg Oral Given Sudeep Vidal RN     07/17/2018 0345 promethazine (PHENERGAN) 12 5 mg/10 mL oral syrup 12 5 mg 12 5 mg Oral Not Given Sallie Arnold RN     07/17/2018 9073 cholecalciferol (VITAMIN D3) tablet 1,000 Units 1,000 Units Oral Given Sudeep Vidal RN     07/17/2018 2457 clotrimazole (LOTRIMIN) 1 % cream   Topical Given Sudeep Vidal RN     07/17/2018 0835 sertraline (ZOLOFT) tablet 25 mg 25 mg Oral Given Suedep Vidal RN     07/17/2018 5610 sodium bicarbonate tablet 1,300 mg 1,300 mg Oral Given Sudeep Vidal RN     07/17/2018 0407 sodium chloride 0 9 % infusion 125 mL/hr Intravenous New 100 [facility]     07/17/2018 0406 sodium chloride 0 9 % infusion 0 mL/hr Intravenous Stopped Sallie Arnold RN     07/17/2018 0308 sodium chloride 0 9 % infusion 125 mL/hr Intravenous [facility], RN     07/17/2018 2122 heparin (porcine) subcutaneous injection 5,000 Units 5,000 Units Subcutaneous Given Sallie Arnold RN     07/17/2018 7581 insulin lispro (HumaLOG) 100 units/mL subcutaneous injection 1-6 Units 4 Units Subcutaneous Given Sallie Arnold RN        Allergies:  Aspirin - itching    Objective:  Vital Signs:  Temp:  [99 2 °F (37 3 °C)-99 7 °F (37 6 °C)] 99 2 °F (37 3 °C)  HR:  [80-96] 85  Resp:  [18-20] 18  BP: (111-152)/(56-77) 121/58   O2 sat: 97% on room air    Physical Exam:  Patient is alert and oriented  HENT: Bilateral hearing loss  Eyes: EOM normal  Cardiovascular - normal rate, intact pedal pulses, no murmurs upon auscultation  Respiratory: clear to auscultation, no wheezes or crackles  Abdominal: intact bowel sounds, possibly diminished on left side  Tenderness in RUQ worsened upon palpation  Neuro - bilateral light touch and pinprick sensation intact in hands, feet, and ankles  Genitourinary:    Results Reviewed     Procedure Component Value Units Date/Time    Gamma GT [[id number]]  (Normal) Collected:  07/16/18 2249    Lab Status:  Final result Specimen:  Blood from Arm, Right Updated:  07/17/18 0809     GGT 10 U/L     Narrative:       Test processed from 7/17/18 @ 07 [id number] specimen    Blood culture [[id number]] Collected:  07/17/18 0251    Lab Status: In process Specimen:  Blood from Arm, Left Updated:  07/17/18 0300    Lactic acid x2 [[id number]]  (Normal) Collected:  07/17/18 0052    Lab Status:  Final result Specimen:  Blood from Arm, Right Updated:  07/17/18 0116     LACTIC ACID 1 9 mmol/L     Narrative:         Result may be elevated if tourniquet was used during collection  NT-BNP PRO [[id number]]  (Abnormal) Collected:  07/16/18 2249    Lab Status:  Final result Specimen:  Blood from Arm, Right Updated:  07/17/18 0008     NT-proBNP 479 (H) pg/mL     Troponin I [[id number]]  (Normal) Collected:  07/16/18 2336    Lab Status:  Final result Specimen:  Blood from Arm, Right Updated:  07/17/18 0002     Troponin I <0 02 ng/mL     Lactic acid x2 [[id number]]  (Abnormal) Collected:  07/16/18 2249    Lab Status:  Final result Specimen:  Blood from Arm, Right Updated:  07/16/18 2348     LACTIC ACID 2 1 (HH) mmol/L     Narrative:         Result may be elevated if tourniquet was used during collection      Comprehensive metabolic panel [[id number]]  (Abnormal) Collected:  07/16/18 2249    Lab Status:  Final result Specimen:  Blood from Arm, Right Updated:  07/16/18 2333     Sodium 135 (L) mmol/L      Potassium 4 1 mmol/L      Chloride 107 mmol/L      CO2 18 (L) mmol/L      Anion Gap 10 mmol/L      BUN 43 (H) mg/dL      Creatinine 3 46 (H) mg/dL      Glucose 336 (H) mg/dL      Calcium 8 7 mg/dL      AST 6 U/L      ALT 15 U/L      Alkaline Phosphatase 158 (H) U/L      Total Protein 8 7 (H) g/dL      Albumin 3 4 (L) g/dL      Total Bilirubin 0 34 mg/dL      eGFR 16 ml/min/1 73sq m     Narrative:         National Kidney Disease Education Program recommendations are as follows:  GFR calculation is accurate only with a steady state creatinine  Chronic Kidney disease less than 60 ml/min/1 73 sq  meters  Kidney failure less than 15 ml/min/1 73 sq  meters  CBC and differential [[id number]]  (Abnormal) Collected:  07/16/18 2248    Lab Status:  Final result Specimen:  Blood from Arm, Right Updated:  07/16/18 2306     WBC 7 97 Thousand/uL      RBC 4 22 Million/uL      Hemoglobin 11 8 (L) g/dL      Hematocrit 36 4 (L) %      MCV 86 fL      MCH 28 0 pg      MCHC 32 4 g/dL      RDW 14 1 %      MPV 10 2 fL      Platelets 208 Thousands/uL      nRBC 0 /100 WBCs      Neutrophils Relative 76 (H) %      Immat GRANS % 1 %      Lymphocytes Relative 13 (L) %      Monocytes Relative 10 %      Eosinophils Relative 0 %      Basophils Relative 0 %      Neutrophils Absolute 6 03 Thousands/µL      Immature Grans Absolute 0 05 Thousand/uL      Lymphocytes Absolute 1 06 Thousands/µL      Monocytes Absolute 0 78 Thousand/µL      Eosinophils Absolute 0 02 Thousand/µL      Basophils Absolute 0 03 Thousands/µL     Blood culture #1 [[id number]] Collected:  07/16/18 2250    Lab Status: In process Specimen:  Blood from Arm, Right Updated:  07/16/18 2256        HgB A1C:7 7  Procalcitonin: 0 59 (H)    Imaging: CXR showed no abnormalities    Assessment:  80 yo M with URI of unknown origin  Elevated procalcitonin suggestive of bacterial origin but clinical symptoms more consistent with viral infection  Also suffering from decreased appetite, weight loss, and dizziness  Possible age-related appetite changes leading to improper dosing of insulin  Plan:  Start broad-spectrum abx for treatment of possible URI    Continue medication for cough suppression  Adjust novolog dosing based on decreased appetite due to dizziness suspected caused by hypoglycemia

## 2018-07-18 VITALS
HEART RATE: 67 BPM | RESPIRATION RATE: 18 BRPM | WEIGHT: 180 LBS | BODY MASS INDEX: 26.66 KG/M2 | TEMPERATURE: 98.8 F | SYSTOLIC BLOOD PRESSURE: 128 MMHG | OXYGEN SATURATION: 96 % | HEIGHT: 69 IN | DIASTOLIC BLOOD PRESSURE: 65 MMHG

## 2018-07-18 DIAGNOSIS — H91.90 HEARING IMPAIRED: Primary | ICD-10-CM

## 2018-07-18 PROBLEM — R42 DIZZINESS: Status: RESOLVED | Noted: 2018-07-17 | Resolved: 2018-07-18

## 2018-07-18 PROBLEM — N17.9 AKI (ACUTE KIDNEY INJURY) (HCC): Status: ACTIVE | Noted: 2018-07-18

## 2018-07-18 PROBLEM — N17.9 AKI (ACUTE KIDNEY INJURY) (HCC): Status: RESOLVED | Noted: 2018-07-18 | Resolved: 2018-07-18

## 2018-07-18 PROBLEM — K21.9 GERD (GASTROESOPHAGEAL REFLUX DISEASE): Status: ACTIVE | Noted: 2018-07-18

## 2018-07-18 PROBLEM — E87.2 LACTIC ACIDOSIS: Status: RESOLVED | Noted: 2018-07-18 | Resolved: 2018-07-18

## 2018-07-18 PROBLEM — E87.2 LACTIC ACIDOSIS: Status: ACTIVE | Noted: 2018-07-18

## 2018-07-18 PROBLEM — E87.2 METABOLIC ACIDOSIS: Status: RESOLVED | Noted: 2018-02-14 | Resolved: 2018-07-18

## 2018-07-18 LAB
ANION GAP SERPL CALCULATED.3IONS-SCNC: 5 MMOL/L (ref 4–13)
BASOPHILS # BLD AUTO: 0.05 THOUSANDS/ΜL (ref 0–0.1)
BASOPHILS NFR BLD AUTO: 1 % (ref 0–1)
BUN SERPL-MCNC: 37 MG/DL (ref 5–25)
CALCIUM SERPL-MCNC: 8.2 MG/DL (ref 8.3–10.1)
CHLORIDE SERPL-SCNC: 109 MMOL/L (ref 100–108)
CO2 SERPL-SCNC: 21 MMOL/L (ref 21–32)
CREAT SERPL-MCNC: 2.99 MG/DL (ref 0.6–1.3)
EOSINOPHIL # BLD AUTO: 0.04 THOUSAND/ΜL (ref 0–0.61)
EOSINOPHIL NFR BLD AUTO: 1 % (ref 0–6)
ERYTHROCYTE [DISTWIDTH] IN BLOOD BY AUTOMATED COUNT: 13.7 % (ref 11.6–15.1)
GFR SERPL CREATININE-BSD FRML MDRD: 19 ML/MIN/1.73SQ M
GLUCOSE SERPL-MCNC: 146 MG/DL (ref 65–140)
GLUCOSE SERPL-MCNC: 266 MG/DL (ref 65–140)
GLUCOSE SERPL-MCNC: 281 MG/DL (ref 65–140)
GLUCOSE SERPL-MCNC: 70 MG/DL (ref 65–140)
HCT VFR BLD AUTO: 31 % (ref 36.5–49.3)
HGB BLD-MCNC: 10.1 G/DL (ref 12–17)
IMM GRANULOCYTES # BLD AUTO: 0.04 THOUSAND/UL (ref 0–0.2)
IMM GRANULOCYTES NFR BLD AUTO: 1 % (ref 0–2)
LYMPHOCYTES # BLD AUTO: 1.93 THOUSANDS/ΜL (ref 0.6–4.47)
LYMPHOCYTES NFR BLD AUTO: 22 % (ref 14–44)
MCH RBC QN AUTO: 28.1 PG (ref 26.8–34.3)
MCHC RBC AUTO-ENTMCNC: 32.6 G/DL (ref 31.4–37.4)
MCV RBC AUTO: 86 FL (ref 82–98)
MONOCYTES # BLD AUTO: 1 THOUSAND/ΜL (ref 0.17–1.22)
MONOCYTES NFR BLD AUTO: 12 % (ref 4–12)
NEUTROPHILS # BLD AUTO: 5.67 THOUSANDS/ΜL (ref 1.85–7.62)
NEUTS SEG NFR BLD AUTO: 63 % (ref 43–75)
NRBC BLD AUTO-RTO: 0 /100 WBCS
PLATELET # BLD AUTO: 185 THOUSANDS/UL (ref 149–390)
PMV BLD AUTO: 10.2 FL (ref 8.9–12.7)
POTASSIUM SERPL-SCNC: 4.2 MMOL/L (ref 3.5–5.3)
RBC # BLD AUTO: 3.6 MILLION/UL (ref 3.88–5.62)
SODIUM SERPL-SCNC: 135 MMOL/L (ref 136–145)
WBC # BLD AUTO: 8.73 THOUSAND/UL (ref 4.31–10.16)

## 2018-07-18 PROCEDURE — 85025 COMPLETE CBC W/AUTO DIFF WBC: CPT | Performed by: INTERNAL MEDICINE

## 2018-07-18 PROCEDURE — 99217 PR OBSERVATION CARE DISCHARGE MANAGEMENT: CPT | Performed by: INTERNAL MEDICINE

## 2018-07-18 PROCEDURE — 80048 BASIC METABOLIC PNL TOTAL CA: CPT | Performed by: INTERNAL MEDICINE

## 2018-07-18 PROCEDURE — 82948 REAGENT STRIP/BLOOD GLUCOSE: CPT

## 2018-07-18 RX ORDER — METOCLOPRAMIDE 5 MG/1
5 TABLET ORAL
Qty: 42 TABLET | Refills: 0 | Status: CANCELLED | OUTPATIENT
Start: 2018-07-18

## 2018-07-18 RX ORDER — METOCLOPRAMIDE 5 MG/1
5 TABLET ORAL
Qty: 30 TABLET | Refills: 0 | Status: SHIPPED | OUTPATIENT
Start: 2018-07-18 | End: 2018-11-05 | Stop reason: SDUPTHER

## 2018-07-18 RX ORDER — SERTRALINE HYDROCHLORIDE 25 MG/1
25 TABLET, FILM COATED ORAL DAILY
Qty: 30 TABLET | Refills: 0 | Status: SHIPPED | OUTPATIENT
Start: 2018-07-18 | End: 2018-11-05 | Stop reason: SDUPTHER

## 2018-07-18 RX ORDER — BENZONATATE 100 MG/1
100 CAPSULE ORAL 3 TIMES DAILY
Qty: 42 CAPSULE | Refills: 0 | Status: CANCELLED | OUTPATIENT
Start: 2018-07-18

## 2018-07-18 RX ORDER — FAMOTIDINE 20 MG/1
20 TABLET, FILM COATED ORAL DAILY
Qty: 14 TABLET | Refills: 0 | Status: CANCELLED | OUTPATIENT
Start: 2018-07-19

## 2018-07-18 RX ORDER — FAMOTIDINE 20 MG/1
20 TABLET, FILM COATED ORAL DAILY
Status: DISCONTINUED | OUTPATIENT
Start: 2018-07-18 | End: 2018-07-18 | Stop reason: HOSPADM

## 2018-07-18 RX ORDER — FAMOTIDINE 20 MG/1
20 TABLET, FILM COATED ORAL DAILY
Qty: 30 TABLET | Refills: 0 | Status: SHIPPED | OUTPATIENT
Start: 2018-07-19

## 2018-07-18 RX ADMIN — SODIUM BICARBONATE 650 MG TABLET 1300 MG: at 17:29

## 2018-07-18 RX ADMIN — METOCLOPRAMIDE 5 MG: 5 TABLET ORAL at 11:23

## 2018-07-18 RX ADMIN — FAMOTIDINE 20 MG: 20 TABLET ORAL at 09:28

## 2018-07-18 RX ADMIN — BENZONATATE 100 MG: 100 CAPSULE ORAL at 09:28

## 2018-07-18 RX ADMIN — METOCLOPRAMIDE 5 MG: 5 TABLET ORAL at 06:31

## 2018-07-18 RX ADMIN — INSULIN LISPRO 12 UNITS: 100 INJECTION, SOLUTION INTRAVENOUS; SUBCUTANEOUS at 17:29

## 2018-07-18 RX ADMIN — BENZONATATE 100 MG: 100 CAPSULE ORAL at 17:29

## 2018-07-18 RX ADMIN — SODIUM BICARBONATE 650 MG TABLET 1300 MG: at 09:28

## 2018-07-18 RX ADMIN — INSULIN LISPRO 3 UNITS: 100 INJECTION, SOLUTION INTRAVENOUS; SUBCUTANEOUS at 11:24

## 2018-07-18 RX ADMIN — METOCLOPRAMIDE 5 MG: 5 TABLET ORAL at 17:28

## 2018-07-18 RX ADMIN — INSULIN LISPRO 12 UNITS: 100 INJECTION, SOLUTION INTRAVENOUS; SUBCUTANEOUS at 09:29

## 2018-07-18 RX ADMIN — SERTRALINE HYDROCHLORIDE 25 MG: 25 TABLET ORAL at 09:33

## 2018-07-18 RX ADMIN — INSULIN LISPRO 12 UNITS: 100 INJECTION, SOLUTION INTRAVENOUS; SUBCUTANEOUS at 11:23

## 2018-07-18 RX ADMIN — SODIUM CHLORIDE, SODIUM GLUCONATE, SODIUM ACETATE, POTASSIUM CHLORIDE, MAGNESIUM CHLORIDE, SODIUM PHOSPHATE, DIBASIC, AND POTASSIUM PHOSPHATE 50 ML/HR: .53; .5; .37; .037; .03; .012; .00082 INJECTION, SOLUTION INTRAVENOUS at 11:01

## 2018-07-18 RX ADMIN — INSULIN GLARGINE 45 UNITS: 100 INJECTION, SOLUTION SUBCUTANEOUS at 09:33

## 2018-07-18 RX ADMIN — HEPARIN SODIUM 5000 UNITS: 5000 INJECTION, SOLUTION INTRAVENOUS; SUBCUTANEOUS at 06:31

## 2018-07-18 NOTE — DISCHARGE INSTRUCTIONS
Tos crónica   LO QUE NECESITA SABER:   Crissy tos crónica es cirssy tos que dura más de 4 semanas en los niños y 8 semanas en los adultos  INSTRUCCIONES SOBRE [facility]ITAL[facility]:   Llame al 911 en nahum de presentar lo siguiente:   · Usted expectora tirso  · Usted se desmaya al toser  · Usted tiene dificultad para respirar  Pregúntele a bray Sherol Mince vitaminas y minerales son adecuados para usted  · Usted tiene nuevos síntomas o asim síntomas empeoran  · Usted tiene un zuri dolor al respirar profundo  · Usted está muy cansado después de un ataque de tos  · Usted tiene dificultad para dormir debido a la tos  · Usted tiene preguntas o inquietudes acerca de bray condición o cuidado  Medicamentos:   · Medicamentos,  podrían ser necesarios para detener la tos  Usted también podría necesitar medicamentos para tratar las alergias o la acidez estomacal o para la inflamación en las vías respiratorias  En nahum que tenga crissy infección respiratoria usted podría necesitar de antibióticos  Un medicamento se lo podrían recetar en presentación de píldora o para que lo use con un inhalador  · [place] asim medicamentos babatunde se le haya indicado  Consulte con bray médico si usted allison que bray medicamento no le está ayudando o si presenta efectos secundarios  Infórmele si es alérgico a cualquier medicamento  Mantenga crissy lista actualizada de los [place], las vitaminas y los productos herbales que eldon  Incluya los siguientes datos de los medicamentos: cantidad, frecuencia y motivo de administración  Traiga con usted la lista o los envases de la píldoras a asim citas de seguimiento  Lleve la lista de los medicamentos con usted en nahum de crissy emergencia  Cuidados personales:   · Evite la acidez estomacal   El reflujo biliar puede provocar que bray tos crónica se empeore  Eleve la dali y la parte superior de la espalda cuando Calvin Soda a dormir  Use más de 2 almohadas debajo de bray dali o duerma en crissy silla reclinable  No se acueste por al menos 1 hora después de comer  No consuma alimentos ni bebidas que le aumenten la [place]  Consulte con bray médico para que le indique otras formas para evitar la [place]  · No fume  Aconséjele a bray girish adolescente que no vaya a fumar  La nicotina y otras sustancias químicas que contienen los cigarrillos y cigarros pueden dañar los pulmones  El fumar además puede empeorar bray tos  Pida información a bray médico si usted actualmente fuma y necesita ayuda para dejar de fumar  Los cigarrillos electrónicos o tabaco sin humo todavía contienen nicotina  Consulte con bray médico antes de [employer]  · Evite la exposición involuntaria al humo del cigarrillo  No permita que las personas fumen en bray vehículo, casa o alrededor de bray girish  No se acerque a crissy persona que esté fumando  Incluso a cualquier persona que esté fumando un cigarrillo electrónico      · Evite cualquier cosa que le desencadena asim alergias o la irritación de la garganta  Los alérgenos e irritantes pueden empeorar bray tos crónica  Los alérgenos [employer] del polvo, el polen, la caspa de las [place]  Use crissy máscara si usted trabaja con sustancias contaminantes o irritantes  Consulte a bray médico por otras formas para disminuir bray exposición a los álergenos o irritantes  · [address]  Los líquidos pueden ayudar a aliviar la incomodidad en bray garganta que le provoca la tos  Para aliviar bray dolor de garganta añada miel al te, elo aromáticas o al [place]  Pregunte cuánto líquido debe malika cada día y cuáles líquidos son los más adecuados para usted  Acuda a asim consultas de control con bray médico según le indicaron  Es posible que necesite regresar para que le realicen más exámenes  Bray médico puede referirlo con otros especialistas  Anote asim preguntas para que se acuerde de hacerlas le asim visitas     © 2017 [address] Ma Information is for End User's use only and may not be sold, redistributed or otherwise used for commercial purposes  All illustrations and images included in CareNotes® are the copyrighted property of A D A M , Inc  or Norberto Solitario  Esta información es sólo para uso en educación  Bray intención no es darle un consejo médico sobre enfermedades o tratamientos  Colsulte con bray Ashok Ahle farmacéutico antes de seguir cualquier régimen médico para saber si es seguro y efectivo para usted  Diabetes en el adulto mayor   LO QUE NECESITA SABER:   ¿Qué necesito saber si soy un adulto mayor con diabetes? Los knowNormal con diabetes están en riesgo de tener enfermedad cardíaca, derrame cerebral, enfermedad renal, ceguera y daño a los nervios  También podría estar en riesgo de alguna de las siguientes condiciones:  · Nutrición deficiente o bajos niveles de azúcar en la tirso    · Confusión o problemas con la memoria, la atención o para aprender cosas nuevas    · Problemas para controlar la orina o infecciones urinarias frecuentes    · Problemas con la coordinación o el equilibrio    · Caídas y lesiones    · Dolor    · Depresión    · Llagas abiertas en las piernas o los pies  ¿Qué es la sigla de la diabetes? La sigla [facility] significa ciertas cosas que puede hacer para controlar o evitar los problemas causados por la diabetes:  · A  representa la prueba A1c   Esta prueba muestra el promedio de la cantidad de azúcar en bray tirso en los últimos 2 a 3 meses  Los [employer] de azúcar en la tirso pueden dañar bray corazón, los vasos sanguíneos, los riñones, los pies y los ojos  La mayoría de los adultos mayores con diabetes debe tener un nivel de A1c inferior a 7 5  Consulte con bray médico si nicci objetivo de valor de A1c es apropiado para usted  Bray médico puede ayudarle a hacer cambios si bray valor de A1c está demasiado alto       · P  representa la presión arterial   La presión arterial tushar pueden aumentar el riesgo de un ataque cardíaco, derrame cerebral o enfermedad renal  La mayoría de los adultos mayores con diabetes debe tener crissy presión arterial sistólica (primer número) de 140 y [facility] presión arterial diastólica (jey número) por debajo de 90  Consulte con bray médico si estos objetivos de presión arterial son apropiados para usted  · C  representa el colesterol   Los [employer] de colesterol pueden obstruir las arterias y causar un ataque cardíaco o derrame cerebral  Consulte con bray médico cuáles deberían ser asim valores de colesterol  · F  representa dejar de fumar   La nicotina puede perjudicar los vasos sanguíneos e impedir que pueda controlar bray diabetes  ¿Qué puedo hacer para controlar los factores de la sigla [facility] y prevenir los problemas causados por la diabetes? · Revise bray nivel de azúcar en la tirso babatunde se le haya indicado  Bray médico le dirá cuándo y con qué frecuencia lo debe revisar  Bray médico también le indicará cuáles deben ser asim niveles de azúcar en la tirso antes y después de [facility] comida  Usted puede necesitar revisar bray orina o tirso por la presencia de cetonas, en nahum que bray nivel esté más alto de lo recomendado  Anote asim resultados y muéstreselos a bray médico  Puede que éste utilice los resultados para realizar modificaciones en bray medicación y bray alimentación o en los horarios en que usted hace ejercicio  Pídale a bray médico más información acerca de cómo tratar un nivel de azúcar en la tirso alto o bajo  · Siga bray plan de comidas según indicaciones  Un dietista lo ayudará a hacer un plan de comidas para mantener bray nivel de azúcar en la tirso estable y asegurarse crissy nutrición adecuada  No se salte ninguna comida  Bray nivel de azúcar en la tirso puede bajar demasiado si usted ha tomado medicamento para la diabetes y no ha comido  Consulte con bray médico acerca de los programas en bray comunidad que pueden ofrecerle la entrega de comidas a domicilio       · Intente estar Alexa Celestine de 30 a 60 minutos ariel todos los días de la [place]  La actividad física puede ayudarlo a mantener el nivel de glucosa en la tirso estable, disminuir bray riesgo de insuficiencia cardíaca y Leticia Riding a bajar de [place]  También puede ayudarlo a mejorar bray equilibrio y [place] riesgo de caídas  Colabore con bray médico para elaborar un plan de ejercicios  Pídale a un familiar o amigo que aletha ejercicio con usted  Comience lentamente y ejercite de 5 a 10 minutos a la vez  Ejemplos de actividades incluyen caminar o nadar  Incluya ejercicios para fortalecer los músculos de 2 a 3 días a la semana  Incluya entrenamiento del equilibrio de 2 a 3 veces a la semana  Actividades que ayudan a aumentar el equilibrio incluyen yoga y carlos chi      · Mantenga un peso saludable  Consulte con bray médico cuánto debería pesar  El peso saludable puede ayudarle a controlar bray diabetes y a evitar crissy enfermedad cardíaca  Solicite a bray médico que le ayude a crear un plan para perder peso de crissy forma grande si usted tiene sobrepeso  Juntos podrán fijar metas de pérdida de peso alcanzables  · No fume  Pida información a bray médico si usted actualmente fuma y necesita ayuda para dejar de fumar  No use cigarrillos electrónicos o tabaco sin humo en vez de cigarrillos o para tratar de dejar de fumar  Todos estos aún contienen nicotina  · Controle el estrés  El estrés puede aumentar bray nivel de azúcar en la tirso  La respiración profunda, la relajación muscular y la música pueden ayudarlo a relajarse  Solicite a bray médico más información sobre estas prácticas  ¿Qué más puedo hacer para manejar mi diabetes? · Revise alaina pies todos los días para raysa si tienen llagas  Observe todo el pie, incluyendo la planta del pie, entre y [employer] dedos  Revise si hay heridas y callos  Use un shelia para verse la planta de los pies   La piel de los pies podría estar brillante, tirante, seca o más oscura de lo normal  Alaina pies [facility] estar fríos y pálidos  Pase asim woo por encima, por debajo, por los lados y [employer] dedos del pie para sentir la piel  El enrojecimiento, inflamación y calor son signos de problemas con el flujo sanguíneo que pueden conllevar a crissy úlcera en el pie  No trate de quitarse los callos usted mismo  · Use identificación de alerta médica  Use un brazalete o collar de alerta médica o lleve consigo crissy tarjeta que indique que tiene diabetes  Pregúntele a bray médico dónde conseguir estos artículos  · [place] vacunas  Usted corre un mayor riesgo de presentar enfermedades graves si usted contrae la gripe, neumonía o hepatitis  Pregúntele a bray médico si usted debe ponerse la vacuna contra la gripe, la neumonía o la hepatitis B, y cuándo debe hacerlo  · Asista a todas asim citas  Necesitará regresar para que le realicen el examen de hemoglobina A1c cada 3 meses  Tendrá que regresar por lo menos crissy vez al año para que le revisen los pies  Necesitará de un examen de la vista crissy vez al año para revisar si tiene retinopatía  También necesitará exámenes de orina anuales para revisar si hay problemas renales  Es posible que deba realizarse exámenes para detectar enfermedad cardíaca  Anote asim preguntas para que se acuerde de hacerlas le asim visitas  · Pídale ayuda a asim familiares y amigos  Puede que necesite ayuda para comprobar bray nivel de azúcar en la tirso, inyectarse la insulina o preparar las comidas  Pídale a asim familiareas y amigos que lo ayuden con estas tareas  Hable con bray médico si no tiene a nadie que le ayude en bray hogar  Un médico puede venir a bray casa para ayudarlo con estas tareas    Llame al 911 en nahum de presentar lo siguiente:   · Usted tiene alguno de los siguientes signos de derrame cerebral:      ¨ Adormecimiento o caída de un lado de bray marianna     ¨ Debilidad en un brazo o crissy pierna    ¨ Confusión o debilidad para hablar    ¨ Mareos o dolor de dali intenso, o pérdida de la visión  · Usted tiene alguno de los siguientes signos de un ataque cardíaco:      ¨ Estornudos, presión, o dolor en bray pecho que dura mas de 5 minutos o regresa  ¨ Malestar o dolor en bray espalda, dat, mandíbula, abdomen, o brazo     ¨ Dificultad para respirar    ¨ Náuseas o vómito    ¨ Siente un desvanecimiento o tiene sudores fríos especialmente en el pecho o dificultad para respirar  ¿Cuándo joan buscar atención inmediata? · Usted tiene dolor abdominal intenso, o el dolor se extiende a bray espalda  Es posible que también esté vomitando  · Usted tiene dificultad para permanecer despierto o concentrarse  · Usted está temblando o sudando  · Usted tiene visión borrosa o doble  · Bray aliento huele a fruta o prudence  · Bray respiración es profunda y [place], o rápida y superficial      · Bray ritmo cardíaco es acelerado y débil  · Sufre crissy caída y se lastima  ¿Cuándo joan comunicarme con mi médico?   · Tiene vómitos o diarrea  · Tiene malestar estomacal y no puede ingerir los alimentos de bray plan de comidas  · Usted se siente débil o más cansado de lo habitual      · Usted tiene [place], yuliana de [place] o se irrita con facilidad  · Bray piel está tequila, tibia, seca o inflamada  · Usted tiene crissy herida que no cicatriza  · Usted tiene entumecimiento en los brazos o piernas  · Usted tiene problemas para sobrellevar bray enfermedad, o se siente ansioso o deprimido  · Usted tiene problemas con la memoria  · Usted presenta cambios en bray visión  · Usted tiene preguntas o inquietudes acerca de bray condición o cuidado  ACUERDOS SOBRE BRAY CUIDADO:   Usted tiene el derecho de ayudar a planear bray cuidado  Aprenda todo lo que pueda sobre bray condición y babatunde darle tratamiento  Discuta asim opciones de tratamiento con asim médicos para decidir el cuidado que usted desea recibir  Usted siempre tiene el derecho de rechazar el tratamiento   Esta información es sólo para uso en educación  Hodgson intención no es darle un consejo médico sobre enfermedades o tratamientos  Colsulte con hodgson Zaire Dutton farmacéutico antes de seguir cualquier régimen médico para saber si es seguro y efectivo para usted  © 2017 26[address] Ma Information is for End User's use only and may not be sold, redistributed or otherwise used for commercial purposes  All illustrations and images included in CareNotes® are the copyrighted property of A D A M , Inc  or Norberto Solitario  Enfermedad renal diabética   LO QUE NECESITA SABER:   ¿Qué es la enfermedad renal diabética? La enfermedad renal diabética es la pérdida gradual y permanente de la función renal  [place] ocurre debido al daño en los riñones causado por niveles altos de azúcar en la tirso  Generalmente, los riñones se encargan de eliminar líquidos, químicos y desperdicios de la tirso  Los riñones convierten estos desechos en [place]  Cuando usted sufre de enfermedad renal diabética, alaina riñones no funcionan correctamente  ¿Cuáles son los signos y síntomas de la enfermedad renal diabética? Alaina signos y síntomas dependerán de lo kathe que funcionen alaina riñones  Es probable que usted no tenga ningún síntoma o tenga cualquiera de los siguientes:  · Cambios en la frecuencia que usted necesita orinar    · Inflamación en el tobillo y pierna    · Elva Leonardo y debilidad    · Comezón    · Calambres musculares    · Náuseas, vómitos o pérdida del apetito  ¿Cómo se diagnostica la enfermedad renal diabética? Los exámenes de Andry seay y [place] mostrarán lo kathe que están funcionando alaina riñones  Es probable que se aletha crissy biopsia para asegurarse de que no hayan otras causas de hodgson enfermedad renal  Kaw biopsia es un procedimiento que se hace para remover y examinar un pedazo pequeño de tejido de hodgson Kathy Tran  ¿Cómo se trata la enfermedad renal diabética?   La meta del tratamiento es controlar alaina síntomas y prevenir el empeoramiento de [place] enfermedad renal  Es posible que usted necesite alguno de los siguientes:  · Medicamentos,  se podrían administrar para disminuir la presión arterial y eliminar el líquido en exceso  Los medicamentos para la presión arterial pueden ayudar a disminuir la velocidad de la pérdida de la función renal      · Diálisis  es un tratamiento que puede ser necesario para eliminar los químicos y desperdicios de bray tirso cuando los riñones ya no pueden cumplir con esta función  · Cirugía  puede ser necesaria para crear Durward Sandifer arteriovenosa en bray brazo o colocarle crissy sonda en bray abdomen o pecho  P[place]alani se hace para que usted pueda recibir [employer] de diálisis  · Un trasplante de riñón  se puede hacer si bray enfermedad renal diabética se convierte en severa  ¿Cómo se controla la enfermedad renal diabética? · Controle asim niveles de azúcar en la tirso  Si usted [place] insulina o eldon medicamentos para la diabetes, tome asim medicamentos según indicaciones  Siga el plan de comidas y ejercicios recomendado por bray médico  Revise asim niveles de azúcar todos los días, con la frecuencia recomendada por bray médico  Es probable que bray médico quiera que usted se revise bray nivel A1c cada 3 a 6 meses  La mayoría de las personas deben mantener bray A1c en 7% o menos  · Siga bary plan de comidas según indicaciones  Es probable que usted necesite comer solamente crissy cierta cantidad de proteínas en cada comida  Colabore con bray dietista para desarrollar un plan de alimentación que sea adecuado para usted  · Controle bray presión arterial   Disminuya el sodio (sal) en bray dieta para ayudar a controlar bray presión arterial  La pérdida de peso y la [place] física regular también pueden ayudar a disminuir bray presión arterial  Otras cosas que usted puede hacer para ayudar a bajar bray presión arterial incluyen evitar el alcohol y dejar de fumar, si fuma      · Consulte con bray médico sobre medicamentos que se venden sin receta médica que usted debe evitar  Algunos de [employer] de venta jose, babatunde el ibuprofeno, pueden ser dañinos para asim riñones  Llame al 911 en nahum de presentar lo siguiente:   · Usted sufre crissy convulsión  · Usted tiene dolor de pecho repentino o falta de aire  ¿Cuándo joan buscar atención inmediata? · Moreira corazón esta latiendo más rápido de lo normal      · Usted está confundido y [employer]  ¿Cuándo joan comunicarme con mi médico?   · De repente, usted sube o pierde más peso del que le ha indicado moreira médico      · Usted tiene comezón o sarpullido en la piel  · Usted tiene náuseas y vómitos recurrentes  · Usted tiene fatiga o debilidad muscular  · Usted tiene más necesidad de [place] o dolor al orinar, tirso en la orina o un olor zuri de la [place]  · Usted tiene preguntas o inquietudes acerca de moreira condición o cuidado  ACUERDOS SOBRE MOREIRA CUIDADO:   Usted tiene el derecho de ayudar a planear moreira cuidado  Aprenda todo lo que pueda sobre moreira condición y babatunde darle tratamiento  Discuta asim opciones de tratamiento con asim médicos para decidir el cuidado que usted desea recibir  Usted siempre tiene el derecho de rechazar el tratamiento  Esta información es sólo para uso en educación  Moreira intención no es darle un consejo médico sobre enfermedades o tratamientos  Colsulte con moreira Avelino Pier farmacéutico antes de seguir cualquier régimen médico para saber si es seguro y efectivo para usted  © 2017 [address] Ma Information is for End User's use only and may not be sold, redistributed or otherwise used for commercial purposes  All illustrations and images included in CareNotes® are the copyrighted property of A JOYA A M , Inc  or Norberto Solitario  Conteo básico de carbohidratos   CUIDADO AMBULATORIO:   El conteo de carbohidratos  es [place] de planificar asim comidas contando la cantidad de carbohidratos de los alimentos   Yesi Ghotra son los azúcares, almidones y fibras que se encuentran en las frutas, granos, verduras y productos lácteos  Los carbohidratos [employer] niveles de azúcar en la tirso  El conteo de carbohidratos puede ayudarle a comer la cantidad [place] de carbohidratos para mantener asim niveles de glucosa (azúcar) en tirso bajo control  Lo que necesita saber sobre la planificación de las comidas utilizando el conteo de carbohidratos:  · Un dietista o un médico le ayudará a desarrollar un plan alimenticio saludable que trabajará mejor para usted  Le enseñarán cuántos carbohidratos debe consumir o beber en cada comida o merienda  Bray plan alimenticio estará basado en bray edad, peso, dieta usual y [address] [facility]  Si usted tiene diabetes, también incluirá bray nivel de azúcar en tirso y medicamentos para la diabetes  Cuando usted está informado de la cantidad de carbohidratos que debe consumir, entonces puede decidir los tipos de alimentos que quiere comer  · Necesitará saber qué alimentos contienen carbohidratos y cuántos contienen  Lleva la cuenta de la cantidad de carbohidratos en alimentos y meriendas para poder seguir bray plan alimenticio  No evite los carbohidratos ni omita comidas  Si usted no consume suficiente cantidad de carbohidratos u omite comidas, los niveles de azúcar en bray tirso pueden bajar excesivamente  Alimentos que contienen carbohidratos:   · Panes:  Cada porción de comida en la lista siguiente contiene cerca de 15 g de carbohidratos     ¨ 1 rebanada de pan (1 onza) o 1 tortilla de harina o maíz (6 pulgadas)    ¨ La ½ de pan para hamburguesa o ¼ de crissy rosquilla avila (aproximadamente 1 onza)    ¨ 1 panqueque (4 pulgadas en diámetro y ¼ de [place])    · Cereales y granos:  El tamaño de las porciones de cereales listos para comer pueden variar  Kan el tamaño de la porción y la cantidad de carbohidratos alistados en la etiqueta alimenticia   Cada porción de comida en la lista siguiente contiene cerca de 15 g de carbohidratos     ¨ ¾ taza de cereal seco, sin endulzar, listo para comer o ¼ taza de granola baja en grasa     ¨ ½ taza de alexis u otros cereales cocidos     ¨ ? taza de arroz o pasta cocida    · Frijoles y vegetales con almidón:  Cada porción de comida en la lista siguiente contiene cerca de 15 g de carbohidratos     ¨ ½ de taza de maíz, chícharos verdes, camote o puré de papa    ¨ ¼ de crissy papa avila horneada    ¨ ½ taza de frijoles, lentejas y guisantes (tamika, jenny, osiris, sofie, finn, de bryson cristian)    · [place] y meriendas:  Cada porción de comida en la lista siguiente contiene cerca de 15 g de carbohidratos     ¨ 3 galletas de harina cuadradas u 8 galletas en forma de animalitos     ¨ 6 galletas saladas    ¨ 3 tazas de palomitas de [place] o ¾ onzas de pretzels, lidia fritas o totopos    · Frutas:  Cada porción de comida en la lista siguiente contiene cerca de 15 g de carbohidratos     ¨ 1 pieza pequeña (4 onzas) de fruta fresca o ¾ a 1 taza de fruta fresca    ¨ ½ taza de fruta enlatada o congelada, envasada en jugo natural    ¨ ½ taza (4 onzas) de [place] de fruta sin azúcar    ¨ 2 cucharadas de fruta seca    · Alimentos azucarados o postres:  Cada porción de comida en la lista siguiente contiene cerca de 15 g de carbohidratos     ¨ 1 karl de 2 pulgadas de pastel sin glaseado o brownie     ¨ 2 galletas dulces pequeñas    ¨ ½ taza de helado, yogur congelado o yogur congelado sin lactosa    ¨ ¼ de taza de sorbete    ¨ 1 cucharada de [place] regular, mermelada o [place]    ¨ 2 cucharadas de jarabe ligero    · [place] y yogur:  Los alimentos derivados de la leche contienen cerca de 12 g de carbohidratos por ración      ¨ 1 taza de leche sin grasa o baja en grasa    ¨ 1 taza de leche de soja    ¨ 1 taza de yogur sin grasa que ha sido endulzado con endulzante artificial    · Verduras sin almidón:  Cada porción de comida contiene cerca de 5 g de carbohidratos Jun porciones de vegetales sin almidón cuentan babatunde 1 porción de carbohidratos  ¨ ½ de taza de verduras cocidas o 1 taza de verduras crudas Estas incluyen remolachas, bróculi, repollo, coliflor, pepino, champiñones, tomates y calabaza  ¨ ½ taza de jugo de verduras  Cómo utilizar el conteo de carbohidratos para planificar las comidas:   · [employer] las cantidades de carbohidratos usando el tamaño de las porciones:      ¨ Ejemplo de crissy mariah de pasta:  Planifica comer pasta, ensalada mixta y un vaso de 8 onzas de Lyle  Bray médico le dice que puede consumir 4 porciones de carbohidratos para la mariah  Crissy porción de carbohidratos de pasta es ? de taza  Crissy taza de pasta será igual a 3 porciones de carbohidratos  Un vaso de 8 onzas de leche se cuenta babatunde 1 porción de carbohidratos  Estas cantidades de alimentos sería igual a 4 porciones de carbohidratos  Crissy taza de ensalada mixta no cuenta para las porciones de carbohidratos  · Cuente la cantidad de carbohidratos utilizando las etiquetas alimenticias:  Busque la cantidad total de los carbohidratos en los alimentos envasados leyendo la etiqueta alimenticia  Las etiquetas alimenticias explican el tamaño de la porción del alimento y la cantidad total de los carbohidratos en cada porción  Busque el tamaño de la porción en la etiqueta alimenticia y después decida cuántas porciones comerá  Multiplique el número de porciones que planea comer por la cantidad de carbohidratos por porción  ¨ Ejemplo de crissy merienda con crissy fady de granola: Bray plan de comidas le permite consumir 2 porciones de carbohidratos (30 gramos) babatunde bocadillo  Planea comer 1 paquete de barras de granola, el cual contiene 2 barras  Según la etiqueta alimenticia, el tamaño de la porción en nicic paquete es 1 fady  Cada porción (1 fady) contiene 25 gramos de carbohidratos  La cantidad total de carbohidratos en el paquete de barras de granola sería 50 gramos  Basándose en bray plan alimenticio, usted debe de comer sólo 1 fady de lukas  Acuda a asim consultas de control con hodgson médico según le indicaron  Anote asim preguntas para que se acuerde de hacerlas le asim visitas  © 2017 [address] [employer] Information is for End User's use only and may not be sold, redistributed or otherwise used for commercial purposes  All illustrations and images included in CareNotes® are the copyrighted property of [employer]  or Norber[employer]  Esta información es sólo para uso en educación  Hodgson intención no es darle un consejo médico sobre enfermedades o tratamientos  Colsulte con hodgson Siri Cables farmacéutico antes de seguir cualquier régimen médico para saber si es seguro y efectivo para usted

## 2018-07-18 NOTE — PROGRESS NOTES
IM Residency Progress Note   Unit/Bed#: [facility] 427-01 Encounter: [id number]  SOD Team A      Bree Betancourt 81 y o  male [id number]    Hospital Stay Days: 0      Assessment/Plan:    Principal Problem:    SOB (shortness of breath)  Active Problems:    Hx of bladder cancer    Anemia of chronic disorder    Benign essential hypertension    Chronic kidney disease, stage IV (severe) (HCC)    Diastolic dysfunction    DM type 2 (diabetes mellitus, type 2) (HCC)    Metabolic acidosis    Diabetic nephropathy (HCC)    Depression    Cough    Fatigue    Dizziness    1  SOB - resolved  Pt with no complaints and resting comfortably on RA with O2 95%  Pt remains afebrile, WBC WNL, CXR 7/17 shows no acute cardiopulmonary disease   - Given above findings, low suspicion for acute infection  Likely URI vs  2/2 increased cough  - Continue to treat cough as below    2  Cough - stable  Pt with h/o chronic cough over the past 6 months  CXR 7/17 negative for acute cardiopulmonary process  Also possible that there is an element of reflux causing the cough as patient is high risk for diabetic gastroparesis as he already has nephropathy   - Continue Tessalon perles  - Continue Pepcid/Phenergan  - Follow up outpatient    3  DM type 2 on insulin - Pt on 45u of Lantus at bedtime and mealtime insulin  H/o poor diet compliance with last Hgb A1c 9 0%  Blood glucose well controlled while admitted  - Continue 45u Lantus at bedtime + SSI  - Close follow-up outpatient for education and monitoring    4  RIZWANA in setting of CKD stage IV - improving  Secondary to diabetic nephropathy, baseline creatinine ~2 75  Cr on admission 3 46   - F/u Cr 2 99 after gentle IVF and electrolyte replenishment  - Will schedule BMP outpatient 1 week after discharge to ensure kidney function returns to baseline    5  Depression - pt on Zoloft recently prescribed  Associated with anhedonia and loss of appetite    - Continue Zoloft  - F/u outpatient    Disposition: Discharge home Subjective:   Pt with no acute events overnight  No new or concerning symptoms including fever, chills, CP, SOB, abd pain, N/V/D  Resting comfortably and states he feels well and is eating without issue  Vitals: Temp (24hrs), Av 4 °F (37 4 °C), Min:98 3 °F (36 8 °C), Max:100 4 °F (38 °C)  Current: Temperature: 98 3 °F (36 8 °C)  Vitals:    18 0822 18 1604 18 2339 18 0750   BP: 121/58 154/78 139/65 131/62   BP Location: Right arm Left arm Left arm Left arm   Pulse: 85 88 81 74   Resp:    Temp:  100 4 °F (38 °C) 99 5 °F (37 5 °C) 98 3 °F (36 8 °C)   TempSrc:  Oral Oral Oral   SpO2:  98% 97% 95%   Weight:       Height:        Body mass index is 26 58 kg/m²  I/O last 24 hours: In: 0 [P O :2280; I V :]  Out: 2200 [Urine:2200]      Physical Exam: General appearance: alert and oriented, in no acute distress  Head: Normocephalic, without obvious abnormality, atraumatic  Eyes: conjunctivae/corneas clear  PERRL, EOM's intact  Fundi benign    Neck: no JVD and supple, symmetrical, trachea midline  Lungs: clear to auscultation bilaterally  Heart: regular rate and rhythm, S1, S2 normal, no murmur, click, rub or gallop  Abdomen: soft, non-tender; bowel sounds normal; no masses,  no organomegaly  Extremities: extremities normal, warm and well-perfused; no cyanosis, clubbing, or edema  Neurologic: Grossly normal , answers questions appropriately    Invasive Devices     Peripheral Intravenous Line            Peripheral IV 18 Right Antecubital 1 day          Drain            Urostomy Ureterostomy right RLQ 3 days                          Labs:   Recent Results (from the past 24 hour(s))   Fingerstick Glucose (POCT)    Collection Time: 18  4:08 PM   Result Value Ref Range    POC Glucose 274 (H) 65 - 140 mg/dl   Fingerstick Glucose (POCT)    Collection Time: 18  9:20 PM   Result Value Ref Range    POC Glucose 53 (L) 65 - 140 mg/dl   Fingerstick Glucose (POCT)    Collection Time: 07/17/18  9:50 PM   Result Value Ref Range    POC Glucose 95 65 - 140 mg/dl   Fingerstick Glucose (POCT)    Collection Time: 07/17/18 10:24 PM   Result Value Ref Range    POC Glucose 114 65 - 140 mg/dl   Fingerstick Glucose (POCT)    Collection Time: 07/18/18  6:30 AM   Result Value Ref Range    POC Glucose 146 (H) 65 - 140 mg/dl   Basic metabolic panel    Collection Time: 07/18/18  9:40 AM   Result Value Ref Range    Sodium 135 (L) 136 - 145 mmol/L    Potassium 4 2 3 5 - 5 3 mmol/L    Chloride 109 (H) 100 - 108 mmol/L    CO2 21 21 - 32 mmol/L    Anion Gap 5 4 - 13 mmol/L    BUN 37 (H) 5 - 25 mg/dL    Creatinine 2 99 (H) 0 60 - 1 30 mg/dL    Glucose 281 (H) 65 - 140 mg/dL    Calcium 8 2 (L) 8 3 - 10 1 mg/dL    eGFR 19 ml/min/1 73sq m   CBC and differential    Collection Time: 07/18/18  9:41 AM   Result Value Ref Range    WBC 8 73 4 31 - 10 16 Thousand/uL    RBC 3 60 (L) 3 88 - 5 62 Million/uL    Hemoglobin 10 1 (L) 12 0 - 17 0 g/dL    Hematocrit 31 0 (L) 36 5 - 49 3 %    MCV 86 82 - 98 fL    MCH 28 1 26 8 - 34 3 pg    MCHC 32 6 31 4 - 37 4 g/dL    RDW 13 7 11 6 - 15 1 %    MPV 10 2 8 9 - 12 7 fL    Platelets 916 057 - 889 Thousands/uL    nRBC 0 /100 WBCs    Neutrophils Relative 63 43 - 75 %    Immat GRANS % 1 0 - 2 %    Lymphocytes Relative 22 14 - 44 %    Monocytes Relative 12 4 - 12 %    Eosinophils Relative 1 0 - 6 %    Basophils Relative 1 0 - 1 %    Neutrophils Absolute 5 67 1 85 - 7 62 Thousands/µL    Immature Grans Absolute 0 04 0 00 - 0 20 Thousand/uL    Lymphocytes Absolute 1 93 0 60 - 4 47 Thousands/µL    Monocytes Absolute 1 00 0 17 - 1 22 Thousand/µL    Eosinophils Absolute 0 04 0 00 - 0 61 Thousand/µL    Basophils Absolute 0 05 0 00 - 0 10 Thousands/µL   Fingerstick Glucose (POCT)    Collection Time: 07/18/18 11:06 AM   Result Value Ref Range    POC Glucose 266 (H) 65 - 140 mg/dl       Radiology Results: I have personally reviewed pertinent reports          Other Diagnostic Testing:   I have personally reviewed pertinent reports          Active Meds:   Current Facility-Administered Medications   Medication Dose Route Frequency    benzonatate (TESSALON PERLES) capsule 100 mg  100 mg Oral TID    famotidine (PEPCID) tablet 20 mg  20 mg Oral Daily    heparin (porcine) subcutaneous injection 5,000 Units  5,000 Units Subcutaneous Q8H [facility]    insulin glargine (LANTUS) subcutaneous injection 45 Units 0 45 mL  45 Units Subcutaneous QAM    insulin lispro (HumaLOG) 100 units/mL subcutaneous injection 1-6 Units  1-6 Units Subcutaneous TID AC    insulin lispro (HumaLOG) 100 units/mL subcutaneous injection 12 Units  12 Units Subcutaneous TID With Meals    metoclopramide (REGLAN) tablet 5 mg  5 mg Oral TID AC    multi-electrolyte (ISOLYTE-S PH 7 4 equivalent) IV solution  50 mL/hr Intravenous Continuous    sertraline (ZOLOFT) tablet 25 mg  25 mg Oral Daily    sodium bicarbonate tablet 1,300 mg  1,300 mg Oral TID         VTE Pharmacologic Prophylaxis: Heparin  VTE Mechanical Prophylaxis: sequential compression device    Alissa Henriquez DO

## 2018-07-18 NOTE — DISCHARGE SUMMARY
[address] Discharge Summary - Medical March Rains 81 y o  male MRN: [id number]    [address] [facility] BE PPHP 4 MED SURG/SD Room / Bed: [facility] 427/[facility] 660-80 Encounter: [id number]    BRIEF OVERVIEW    Admitting Provider: Moira Iverson MD  Discharge Provider: Moira Iverson MD    Discharge To: Home    Admission Date: 7/16/2018     Discharge Date: No discharge date for patient encounter      Primary Discharge Diagnosis  Principal Problem:    SOB (shortness of breath)  Active Problems:    Hx of bladder cancer    Anemia of chronic disorder    Benign essential hypertension    Chronic kidney disease, stage IV (severe) (HCC)    Diastolic dysfunction    DM type 2 (diabetes mellitus, type 2) (formerly [facility])    Metabolic acidosis    Diabetic nephropathy (HCC)    Depression    Cough    Fatigue    Dizziness    Hard of hearing  Resolved Problems:    RIZWANA (acute kidney injury) ([facility] )      Other Problems Addressed: None    Consulting Providers   None    Therapeutic Operative Procedures Performed  None    Diagnostic Procedures Performed  labs: CBC, BMP, LA, BNP, troponin, Resp viral panel, Strep pneumo, Legionella, UA, microbiology: blood culture: pending and radiology: CXR: no acute cardiopulmonary process    Discharge Disposition: Home/Self Care  Discharged With Lines: no    Test Results Pending at Discharge: Blood cultures    Outpatient Follow-Up  8/2/18 at 8:00am      Follow up with consulting providers  none required   Active Issues Requiring Follow-up   none    Code Status: Level 1 - Full Code  Advance Directive and Living Will: <no information>  Power of :    POLST:      Medications to discontinue on discharge  Insulin - Lantus 45u  Insulin Lispro  Heparin subQ 5000u q8 hrs  Isolyte multi-electrolyte solution 50mL/Hr     Current Discharge Medication List      CONTINUE these medications which have NOT CHANGED    Details   albuterol (5 mg/mL) 0 5 % nebulizer solution Inhale      aspirin 81 MG tablet Take 1 tablet (81 mg total) by mouth daily  Qty: 90 tablet, Refills: 2    Associated Diagnoses: Peripheral vascular disease ([facility])      Blood Glucose Monitoring Suppl (FREESTYLE FREEDOM LITE) w/Device KIT by Does not apply route 3 (three) times a day      cholecalciferol (VITAMIN D3) 1,000 units tablet Take 1 tablet (1,000 Units total) by mouth daily  Qty: 90 tablet, Refills: 2    Associated Diagnoses: Chronic kidney disease, stage IV (severe) ([facility])      clotrimazole (LOTRIMIN) 1 % cream Apply topically 2 (two) times a day  Qty: 30 g, Refills: 4    Associated Diagnoses: Tinea pedis, unspecified laterality      Insulin Glargine (TOUJEO SOLOSTAR) injection pen 300 units/mL Inject 45 Units under the skin daily at bedtime       Insulin Pen Needle (B-D UF III MINI PEN NEEDLES) 31G X 5 MM MISC Inject under the skin 4 (four) times a day with insulin  Qty: 400 each, Refills: 1    Associated Diagnoses: Type 2 diabetes mellitus with complication, with long-term current use of insulin ([facility])      Lancets (FREESTYLE) lancets by Does not apply route      NOVOLOG FLEXPEN 100 units/mL injection pen inject 12 units subcutaneously three times a day with meals  Qty: 15 mL, Refills: 2    Associated Diagnoses: Diabetes mellitus, type II, insulin dependent ([facility])      Phenylephrine-DM-GG-APAP (DELSYM COUGH/COLD DAYTIME) 5--325 MG/10ML LIQD Take 1 Dose by mouth 2 (two) times a day as needed (cough)  Qty: 1 Bottle, Refills: 0    Associated Diagnoses: Cough      sertraline (ZOLOFT) 25 mg tablet Take 1 tablet (25 mg total) by mouth daily  Qty: 90 tablet, Refills: 0    Associated Diagnoses: Depression      sodium bicarbonate 650 mg tablet Take 2 tablets by mouth 3 (three) times a day           NEW Medications on discharge   Tessalon Perles 100mg tid  Reglan 5mg tid before meals  Pepcid 20mg daily       Allergies  Allergies   Allergen Reactions    Aspirin Itching     Discharge Diet: diabetic diet and renal diet  Activity restrictions: none    [address] [place] [address] Course  Pt is an 82 y/o M with a history of type 2 DM on insulin, prostate cancer s/p prostate and bladder removal, CKD Stage IV, and HTN presented to the ED for SOB and productive cough with clear sputum of several weeks' duration  Associated symptoms include fatigue, dizziness, and loss of appetite  In the ED patient was found to have Cr 3 46 (baseline 2 75), LA 2 1, NT-, and troponin negative  Resp panel, Legionella, and Strep pneumo returned negative  Pt responded well to fluid resuscitation remained stable on RA during his stay  Presenting Problem/History of Present Illness  Principal Problem:    SOB (shortness of breath)  Active Problems:    Hx of bladder cancer    Anemia of chronic disorder    Benign essential hypertension    Chronic kidney disease, stage IV (severe) (HCC)    Diastolic dysfunction    DM type 2 (diabetes mellitus, type 2) (HCC)    Diabetic nephropathy (HCC)    Depression    Cough    Fatigue    Hard of hearing    GERD (gastroesophageal reflux disease)  Resolved Problems:    Metabolic acidosis    Dizziness    RIZWANA (acute kidney injury) ([facility] )    Lactic acidosis    1  SOB - stable  Likely associated with chronic cough in setting of reported asthma history  Pt continues to be stable and in NAD on room air   - Continue inhalers as prescribed   - Outpatient f/u    2  Cough - stable  Cough still present on exam, but patient is still in NAD and afebrile with no signs of acute infection  CXR 7/17 negative, low suspicion for infectious process  Possible element of reflux 2/2 possible gastroparesis in setting of diabetic neuropathy/nephropathy    - F/u outpatient to monitor    3  Hypertension - stable  Pt not currently on anti-hypertensives  - Continue to monitor outpatient    4  RIZWANA on CKD - improving  Creatinine today 2 9 (down from 3 34 yesterday)     - F/u BMP in one week to monitor Cr and ensure it continues downtrending  - F/u outpatient, continue DM2 management    5  DM Type 2 on insulin - poorly controlled 2/2 poor diet compliance  Blood glucose well controlled while admitted  Return to home insulin regimen     6  GERD - stable  - Will prescribe Pepcid and phenergan on dc    7  Depression - stable, described as anhedonia and loss of appetite  - Continue Zoloft, will prescribe on dc    Other Pertinent Test Results  None     Discharge Condition: good      Discharge  Statement   I spent 30 minutes minutes discharging the patient  This time was spent on the day of discharge  I had direct contact with the patient on the day of discharge  Additional documentation is required if more than 30 minutes were spent on discharge

## 2018-07-22 LAB
BACTERIA BLD CULT: NORMAL
BACTERIA BLD CULT: NORMAL

## 2018-07-23 ENCOUNTER — TRANSITIONAL CARE MANAGEMENT (OUTPATIENT)
Dept: INTERNAL MEDICINE CLINIC | Facility: CLINIC | Age: 83
End: 2018-07-23

## 2018-07-25 ENCOUNTER — APPOINTMENT (OUTPATIENT)
Dept: LAB | Facility: HOSPITAL | Age: 83
End: 2018-07-25
Payer: COMMERCIAL

## 2018-07-25 DIAGNOSIS — N17.9 AKI (ACUTE KIDNEY INJURY) (HCC): ICD-10-CM

## 2018-07-25 LAB
ANION GAP SERPL CALCULATED.3IONS-SCNC: 8 MMOL/L (ref 4–13)
BUN SERPL-MCNC: 46 MG/DL (ref 5–25)
CALCIUM SERPL-MCNC: 8.6 MG/DL (ref 8.3–10.1)
CHLORIDE SERPL-SCNC: 114 MMOL/L (ref 100–108)
CO2 SERPL-SCNC: 18 MMOL/L (ref 21–32)
CREAT SERPL-MCNC: 3.04 MG/DL (ref 0.6–1.3)
GFR SERPL CREATININE-BSD FRML MDRD: 18 ML/MIN/1.73SQ M
GLUCOSE P FAST SERPL-MCNC: 140 MG/DL (ref 65–99)
POTASSIUM SERPL-SCNC: 4.4 MMOL/L (ref 3.5–5.3)
SODIUM SERPL-SCNC: 140 MMOL/L (ref 136–145)

## 2018-07-25 PROCEDURE — 80048 BASIC METABOLIC PNL TOTAL CA: CPT

## 2018-07-25 PROCEDURE — 36415 COLL VENOUS BLD VENIPUNCTURE: CPT

## 2018-07-27 ENCOUNTER — OFFICE VISIT (OUTPATIENT)
Dept: NEPHROLOGY | Facility: CLINIC | Age: 83
End: 2018-07-27
Payer: COMMERCIAL

## 2018-07-27 VITALS — WEIGHT: 175.8 LBS | BODY MASS INDEX: 25.17 KG/M2 | HEIGHT: 70 IN

## 2018-07-27 DIAGNOSIS — E87.2 METABOLIC ACIDOSIS: ICD-10-CM

## 2018-07-27 DIAGNOSIS — I10 BENIGN ESSENTIAL HYPERTENSION: ICD-10-CM

## 2018-07-27 DIAGNOSIS — N18.4 CHRONIC KIDNEY DISEASE, STAGE IV (SEVERE) (HCC): ICD-10-CM

## 2018-07-27 DIAGNOSIS — N17.9 ACUTE KIDNEY INJURY (HCC): Primary | ICD-10-CM

## 2018-07-27 DIAGNOSIS — Z79.4 TYPE 2 DIABETES MELLITUS WITH DIABETIC AUTONOMIC NEUROPATHY, WITH LONG-TERM CURRENT USE OF INSULIN (HCC): ICD-10-CM

## 2018-07-27 DIAGNOSIS — N25.81 SECONDARY HYPERPARATHYROIDISM OF RENAL ORIGIN (HCC): ICD-10-CM

## 2018-07-27 DIAGNOSIS — E11.43 TYPE 2 DIABETES MELLITUS WITH DIABETIC AUTONOMIC NEUROPATHY, WITH LONG-TERM CURRENT USE OF INSULIN (HCC): ICD-10-CM

## 2018-07-27 DIAGNOSIS — E87.2 METABOLIC ACIDEMIA: ICD-10-CM

## 2018-07-27 PROCEDURE — 99214 OFFICE O/P EST MOD 30 MIN: CPT | Performed by: NURSE PRACTITIONER

## 2018-07-27 PROCEDURE — 1111F DSCHRG MED/CURRENT MED MERGE: CPT | Performed by: NURSE PRACTITIONER

## 2018-07-27 RX ORDER — SODIUM BICARBONATE 650 MG/1
1300 TABLET ORAL 2 TIMES DAILY
Qty: 360 TABLET | Refills: 3 | Status: SHIPPED | OUTPATIENT
Start: 2018-07-27 | End: 2018-11-05 | Stop reason: SDUPTHER

## 2018-07-27 NOTE — PATIENT INSTRUCTIONS
All questions asked and answered  The patient has been instructed to call office at [phone] with any questions or concerns      The patient has been instructed to obtain prescribed blood work and urine studies prior to next appointment  Education material " Kidney Disease: Eating Less Sodium" given to patient today  Will have the patient obtain blood work in September with return office visit in 6-8 weeks  Obtain blood work as discussed with Dr Nhan Horton

## 2018-07-27 NOTE — PROGRESS NOTES
OFFICE FOLLOW UP - Nephrology   Berrios Linden 81 y o  male MRN: [id number]       ASSESSMENT and PLAN:  Diagnoses and all orders for this visit:    [facility] )  -likely secondary to volume depletion with peak creatinine of 3 46  -creatinine has improved to 3 04 with last blood work  -will get blood work in 6-8 weeks to ensure stability    Chronic kidney disease, stage IV (severe) ([facility] )  -likely secondary to diabetes hypertension, and previous hydronephrosis status post urostomy  -previous baseline creatinine 2 6-2 8  -since discharge creatinine 2 9-3 0, most recent blood work reveals creatinine 3 04  -will get blood work in 6-8 weeks to ensure stability of renal function  -may have new baseline  -patient return to office in 6-8 weeks for follow-up  -     Basic metabolic panel; Future  -     CBC and differential; Future  -     Magnesium; Future  -     Protein / creatinine ratio, urine; Future    Benign essential hypertension   -BP acceptable  -no change in medication regiment    Secondary hyperparathyroidism of renal origin ([facility] )  -will recheck PTH with next office visit  -     PTH, intact; Future    Type 2 diabetes mellitus with diabetic autonomic neuropathy, with long-term current use of insulin ([facility])  -need blood sugar control    Metabolic acidemia  -patient admits stopping oral bicarb  -will restart sodium bicarbonate and prescriptions sent  -will check BMP with next office visit  -     sodium bicarbonate 650 mg tablet; Take 2 tablets (1,300 mg total) by mouth 2 (two) times a day          Patient Instructions   All questions asked and answered  The patient has been instructed to call office at [phone] with any questions or concerns      The patient has been instructed to obtain prescribed blood work and urine studies prior to next appointment  Education material " Kidney Disease: Eating Less Sodium" given to patient today  Will have the patient obtain blood work in September with return office visit in 6-8 weeks  Obtain blood work as discussed with Dr Deja Lanza        HPI: Thais Thorne is a 81 y o  male who is here for No chief complaint on file  The patient returns to office for hospital follow up for RIZWANA with known CKD IV  While admitted he was treated for ongoing cough and SOB and given IVF for concerns for volume depletion  Creatinine peaked at 3 46 and was discharged at 2 99 on 7/19/2018  Repeat BMP on 7/25/2018 reveals creatinine 3 04, with stable electrolytes, however bicarb is decreased at 18  Previous baseline creatinine 2 6-2 8 and follows Dr Deja Lanza  On discussion with   Patient is feeling well  Denies chest pain, shortness of Breath, dizziness, lightheaded, nausea, vomiting, lower extremity swelling  He has a urostomy and is draining well  Denies issues  His only complaint is that he is still coughing without fevers or chills  ROS:   All the systems were reviewed and were negative except as documented on the HPI      Allergies: Aspirin    Medications:   Current Outpatient Prescriptions:     albuterol (5 mg/mL) 0 5 % nebulizer solution, Inhale, Disp: , Rfl:     aspirin 81 MG tablet, Take 1 tablet (81 mg total) by mouth daily, Disp: 90 tablet, Rfl: 2    Blood Glucose Monitoring Suppl (FREESTYLE FREEDOM LITE) w/Device KIT, by Does not apply route 3 (three) times a day, Disp: , Rfl:     cholecalciferol (VITAMIN D3) 1,000 units tablet, Take 1 tablet (1,000 Units total) by mouth daily, Disp: 90 tablet, Rfl: 2    clotrimazole (LOTRIMIN) 1 % cream, Apply topically 2 (two) times a day, Disp: 30 g, Rfl: 4    famotidine (PEPCID) 20 mg tablet, Take 1 tablet (20 mg total) by mouth daily, Disp: 30 tablet, Rfl: 0    Insulin Glargine (TOUJEO SOLOSTAR) injection pen 300 units/mL, Inject 45 Units under the skin daily at bedtime , Disp: , Rfl:     Insulin Pen Needle (B-D UF III MINI PEN NEEDLES) 31G X 5 MM MISC, Inject under the skin 4 (four) times a day with insulin, Disp: 400 each, Rfl: 1    Lancets (FREESTYLE) lancets, by Does not apply route, Disp: , Rfl:     metoclopramide (REGLAN) 5 mg tablet, Take 1 tablet (5 mg total) by mouth 3 (three) times a day before meals, Disp: 30 tablet, Rfl: 0    NOVOLOG FLEXPEN 100 units/mL injection pen, inject 12 units subcutaneously three times a day with meals, Disp: 15 mL, Rfl: 2    sertraline (ZOLOFT) 25 mg tablet, Take 1 tablet (25 mg total) by mouth daily, Disp: 30 tablet, Rfl: 0    sodium bicarbonate 650 mg tablet, Take 2 tablets (1,300 mg total) by mouth 2 (two) times a day, Disp: 360 tablet, Rfl: 3    Past Medical History:   Diagnosis Date    Asthma     Cancer ([facility] )     prostate    Diabetes mellitus ([facility] )     UTI (urinary tract infection)     RESOLVED 18 DEC 2012     Past Surgical History:   Procedure Laterality Date    APPENDECTOMY      BLADDER SURGERY      PROSTATE SURGERY       Family History   Problem Relation Age of Onset    Diabetes Mother         TYPE2    Diabetes Brother     Diabetes Family     Heart disease Family     Diabetes Daughter       reports that he has never smoked  He has never used smokeless tobacco  He reports that he does not drink alcohol or use drugs  Physical Exam:   Vitals:    07/27/18 0959   Weight: 79 7 kg (175 lb 12 8 oz)   Height: 5' 10" (1 778 m)     Body mass index is 25 22 kg/m²      General: cooperative, in not acute distress  Eyes: conjunctivae pink, anicteric sclerae  ENT: lips and mucous membranes moist  Neck: supple, no JVD  Chest: clear breath sounds bilateral, no crackles, ronchus or wheezings  CVS:  normal rate, regular rhythm  Abdomen: soft, non-tender, non-distended, normoactive bowel sounds  Extremities: no edema of both legs  Skin: no rash  Neuro: awake, alert, oriented      Lab Results:  Results for orders placed or performed in visit on 21/17/13   Basic metabolic panel   Result Value Ref Range    Sodium 140 136 - 145 mmol/L    Potassium 4 4 3 5 - 5 3 mmol/L Chloride 114 (H) 100 - 108 mmol/L    CO2 18 (L) 21 - 32 mmol/L    Anion Gap 8 4 - 13 mmol/L    BUN 46 (H) 5 - 25 mg/dL    Creatinine 3 04 (H) 0 60 - 1 30 mg/dL    Glucose, Fasting 140 (H) 65 - 99 mg/dL    Calcium 8 6 8 3 - 10 1 mg/dL    eGFR 18 ml/min/1 73sq m         Results from last 7 days  Lab Units 07/25/18  1012   SODIUM mmol/L 140   POTASSIUM mmol/L 4 4   CHLORIDE mmol/L 114*   CO2 mmol/L 18*   BUN mg/dL 46*   CREATININE mg/dL 3 04*   CALCIUM mg/dL 8 6         Portions of the record may have been created with voice recognition software  Occasional wrong word or "sound a like" substitutions may have occurred due to the inherent limitations of voice recognition software  Read the chart carefully and recognize, using context, where substitutions have occurred  If you have any questions, please contact the dictating provider

## 2018-08-15 ENCOUNTER — OFFICE VISIT (OUTPATIENT)
Dept: INTERNAL MEDICINE CLINIC | Facility: CLINIC | Age: 83
End: 2018-08-15
Payer: COMMERCIAL

## 2018-08-15 VITALS
HEART RATE: 64 BPM | HEIGHT: 69 IN | WEIGHT: 175.49 LBS | DIASTOLIC BLOOD PRESSURE: 70 MMHG | TEMPERATURE: 98.8 F | SYSTOLIC BLOOD PRESSURE: 122 MMHG | BODY MASS INDEX: 25.99 KG/M2

## 2018-08-15 DIAGNOSIS — R06.02 SOB (SHORTNESS OF BREATH): Primary | ICD-10-CM

## 2018-08-15 DIAGNOSIS — E11.8 TYPE 2 DIABETES MELLITUS WITH COMPLICATION, UNSPECIFIED WHETHER LONG TERM INSULIN USE: ICD-10-CM

## 2018-08-15 DIAGNOSIS — L72.3 SEBACEOUS CYST: ICD-10-CM

## 2018-08-15 DIAGNOSIS — H91.13 PRESBYCUSIS OF BOTH EARS: ICD-10-CM

## 2018-08-15 LAB
LEFT EYE DIABETIC RETINOPATHY: NORMAL
LEFT EYE IMAGE QUALITY: NORMAL
RIGHT EYE DIABETIC RETINOPATHY: NORMAL
RIGHT EYE IMAGE QUALITY: NORMAL
SEVERITY (EYE EXAM): NORMAL

## 2018-08-15 PROCEDURE — 99214 OFFICE O/P EST MOD 30 MIN: CPT | Performed by: INTERNAL MEDICINE

## 2018-08-15 PROCEDURE — 92250 FUNDUS PHOTOGRAPHY W/I&R: CPT | Performed by: INTERNAL MEDICINE

## 2018-08-15 NOTE — PROGRESS NOTES
Assessment/Plan:        Problem List Items Addressed This Visit        Endocrine    DM type 2 (diabetes mellitus, type 2) ([facility] [address] )     Lab Results   Component Value Date    HGBA1C 7 7 (H) 07/17/2018       · Patient's last A1c was 7 7%, however not due for repeat A1c at this point  · Patient had diabetic eye exam performed in office today   · Can follow up in 3 months for recheck of diabetes  · In the meantime, recommend continuing current dose of Toujeo and NovoLog  · Follow-up with Podiatry           Relevant Orders    POCT diabetic eye exam    Ambulatory referral to Podiatry       Nervous and Auditory    Hard of hearing     · Referral for audiology was ordered on previous hospitalization  · Recommend follow-up with audiology for comprehensive hearing exam  · Patient and spouse both made aware            Musculoskeletal and Integument    Sebaceous cyst     · At this point, do not suspect malignancy  Lesion is fluctuant and tender to palpation  · Can follow up with General surgery for removal if patient so desires         Relevant Orders    Ambulatory referral to General Surgery       Other    SOB (shortness of breath) - Primary     · Suspect be secondary to undiagnosed asthma  · Continue albuterol as needed  · Obtain spirometry in the future         Relevant Orders    Spirometry pre and post bronchodilator           Subjective:     Patient ID: Nini Muse is a 81 y o  male  Patient is here for a follow-up after a hospitalization approximately a month ago, during which he was seen for shortness of breath  Pt reports feeling better since his hospitalization  He reports using alubterol nebulizer approximately 2-3 times a week  Has a reported histroy of asthma, however no pulmonary function tests or spirometry on file  He has an occasional cough productive of scant sputum  He deos not smoke  No history of COPD according to patient        Patient is chronically hard of hearing and was ordered to have a follow-up with audiology as an outpatient, which he has not yet obtained  In addition, the patient brought up a lesion on his back which he says he has been having for about a year  His wife reports that has been increasing in size since then  Lesion is described as tender  He states he has had similar lesions in the past for which he had removed surgically  His wife describes these lesions like "fat          Review of Systems   Constitutional: Negative for chills, fever and unexpected weight change  Respiratory: Positive for cough  Negative for chest tightness and shortness of breath  Cardiovascular: Negative for chest pain and palpitations  Skin:        Lump on back         Objective:     Physical Exam   Constitutional: Vital signs are normal  He appears well-developed and well-nourished  No distress  Cardiovascular: Normal rate, regular rhythm, normal heart sounds and intact distal pulses  Pulmonary/Chest: Effort normal and breath sounds normal  No respiratory distress  Musculoskeletal:        Feet:    Skin: Skin is warm, dry and intact  Lesion noted  He is not diaphoretic  Subcutaneous, tender, fluctuant, semi mobile, 2 x 1 cm mass on right upper thoracic back between shoulder blades  Vitals reviewed  Patient's shoes and socks removed  Right Foot/Ankle   Right Foot Inspection      Sensory       Monofilament testing: diminished      Left Foot/Ankle  Left Foot Inspection                                           Sensory       Monofilament: diminished              Vitals:    08/15/18 0959   BP: 122/70   BP Location: Right arm   Patient Position: Sitting   Cuff Size: Adult   Pulse: 64   Temp: 98 8 °F (37 1 °C)   TempSrc: Oral   Weight: 79 6 kg (175 lb 7 8 oz)   Height: 5' 9" (1 753 m)       Transitional Care Management Review:  Loretta Cortés is a 81 y o  male here for TCM follow up       During the TCM phone call patient stated:    Hospital care reviewed:  Records reviewed  Patient was hopsitalized at:  [facility]  Date of admission:  7/16/18  Date of discharge:  7/18/18  Diagnosis:  SOB - R06 02  Disposition:  Home  Scheduled for follow up?:  Yes (Comment: DR Harp [facility] - 8/2/18 AT 8:00 A M )  I have advised the patient to call PCP with any new or worsening symptoms (please type in name along with any credentials):  JUANITO Call, DO

## 2018-08-15 NOTE — PATIENT INSTRUCTIONS
· Can go see the surgeon in the future for evaluation and possible removal of cyst on back  · Return for lung spirometry in the future  · Continue albuterol as needed  · Follow-up with audiologist for hearing test

## 2018-08-15 NOTE — ASSESSMENT & PLAN NOTE
· Suspect be secondary to undiagnosed asthma  · Continue albuterol as needed  · Obtain spirometry in the future

## 2018-08-15 NOTE — ASSESSMENT & PLAN NOTE
Lab Results   Component Value Date    HGBA1C 7 7 (H) 07/17/2018       · Patient's last A1c was 7 7%, however not due for repeat A1c at this point  · Patient had diabetic eye exam performed in office today   · Can follow up in 3 months for recheck of diabetes  · In the meantime, recommend continuing current dose of Toujeo and NovoLog  · Follow-up with Podiatry

## 2018-08-15 NOTE — ASSESSMENT & PLAN NOTE
· At this point, do not suspect malignancy    Lesion is fluctuant and tender to palpation  · Can follow up with General surgery for removal if patient so desires

## 2018-08-15 NOTE — ASSESSMENT & PLAN NOTE
· Referral for audiology was ordered on previous hospitalization  · Recommend follow-up with audiology for comprehensive hearing exam  · Patient and spouse both made aware

## 2018-08-20 ENCOUNTER — HOSPITAL ENCOUNTER (OUTPATIENT)
Dept: PULMONOLOGY | Facility: HOSPITAL | Age: 83
Discharge: HOME | End: 2018-08-20
Payer: COMMERCIAL

## 2018-08-20 DIAGNOSIS — R06.02 SOB (SHORTNESS OF BREATH): ICD-10-CM

## 2018-08-20 PROCEDURE — 94760 N-INVAS EAR/PLS OXIMETRY 1: CPT

## 2018-08-20 PROCEDURE — 94060 EVALUATION OF WHEEZING: CPT

## 2018-08-20 PROCEDURE — 94060 EVALUATION OF WHEEZING: CPT | Performed by: INTERNAL MEDICINE

## 2018-08-20 RX ORDER — ALBUTEROL SULFATE 2.5 MG/3ML
2.5 SOLUTION RESPIRATORY (INHALATION) ONCE
Status: COMPLETED | OUTPATIENT
Start: 2018-08-20 | End: 2018-08-20

## 2018-08-20 RX ADMIN — ALBUTEROL SULFATE 2.5 MG: 2.5 SOLUTION RESPIRATORY (INHALATION) at 08:08

## 2018-08-21 ENCOUNTER — OFFICE VISIT (OUTPATIENT)
Dept: AUDIOLOGY | Age: 83
End: 2018-08-21
Payer: COMMERCIAL

## 2018-08-21 DIAGNOSIS — IMO0001 ASYMMETRICAL RIGHT SENSORINEURAL HEARING LOSS: ICD-10-CM

## 2018-08-21 DIAGNOSIS — H90.A32 MIXED CONDUCTIVE AND SENSORINEURAL HEARING LOSS OF LEFT EAR WITH RESTRICTED HEARING OF RIGHT EAR: Primary | ICD-10-CM

## 2018-08-21 PROCEDURE — 92557 COMPREHENSIVE HEARING TEST: CPT | Performed by: AUDIOLOGIST

## 2018-08-21 PROCEDURE — 92567 TYMPANOMETRY: CPT | Performed by: AUDIOLOGIST

## 2018-08-21 NOTE — PROGRESS NOTES
HEARING EVALUATION    Name:  Ki Guerrero  :  1935  Age:  81 y o  Date of Evaluation: 18     History: Difficulty Understanding  Reason for visit: Ki Guerrero is being seen today at the request of Dr Tahira Nassar for an evaluation of hearing  Patient reports via Temporal Power  that he has noticed decreased hearing in both ears over the past 6 months or more  He reports a left ear infection 6 to 7 months ago  He denies tinnitus, dizziness, or history of noise exposure  EVALUATION:    Otoscopic Evaluation:   Right Ear: Mild cerumen   Left Ear: Mild cerumen    Tympanometry:   Right: Type A - normal middle ear pressure and compliance   Left: Type A - normal middle ear pressure and compliance       Audiogram Results:  Mild to severe hearing sensorineural hearing loss bilaterally  Conductive component at 500 Hz in left ear only  *see attached audiogram      RECOMMENDATIONS:  6 month hearing eval, Return to [facility]  for F/U and Hearing Aid Evaluation       PATIENT EDUCATION:   Discussed results and recommendations with patient via Temporal Power   Questions were addressed and the patient was encouraged to contact our department should concerns arise  Hearing Aid Evaluation    The patient has [employer], which will cover one hearing aid every 2 years  He is only interested in obtaining one hearing aid at this time  He selected his right ear to be fitted  Selection:   The patient selected the [address] [place] [address] 2 HS ITE  hearing aids  Color: medium brown   Earmold Impressions: right completed     Hearing aid order was mailed to [facility]  Patient will be contacted to schedule HAP upon RFF            Samy Escobar, Audiology Intern  Pretty Leigh CCC-A  Clinical Audiologist

## 2018-08-21 NOTE — LETTER
2018     [facility] [address]    Patient: Kenneth Renae   YOB: 1935   Date of Visit: 2018       Dear Dr Deann Mckee: Thank you for referring Kenneth Renae to me for evaluation  Below are my notes for this consultation  If you have questions, please do not hesitate to call me  I look forward to following your patient along with you  Sincerely,        Lizzy Dudley        CC: No Recipients  Lizzy Buckner  2018  1:21 PM  Sign at close encounter  HEARING EVALUATION    Name:  Kenneth Renae  :  1935  Age:  81 y o  Date of Evaluation: 18     History: Difficulty Understanding  Reason for visit: Kenneth Renae is being seen today at the request of Dr Deann Mckee for an evaluation of hearing  Patient reports via Ravn  that he has noticed decreased hearing in both ears over the past 6 months or more  He reports a left ear infection 6 to 7 months ago  He denies tinnitus, dizziness, or history of noise exposure  EVALUATION:    Otoscopic Evaluation:   Right Ear: Mild cerumen   Left Ear: Mild cerumen    Tympanometry:   Right: Type A - normal middle ear pressure and compliance   Left: Type A - normal middle ear pressure and compliance       Audiogram Results:  Mild to severe hearing sensorineural hearing loss bilaterally  Conductive component at 500 Hz in left ear only  *see attached audiogram      RECOMMENDATIONS:  6 month hearing eval, Return to [facility]  for F/U and Hearing Aid Evaluation       PATIENT EDUCATION:   Discussed results and recommendations with patient via Ravn   Questions were addressed and the patient was encouraged to contact our department should concerns arise  Hearing Aid Evaluation    The patient has [employer], which will cover one hearing aid every 2 years  He is only interested in obtaining one hearing aid at this time   He selected his right ear to be fitted  Selection:   The patient selected the [address] [place] [address] 2 HS ITE  hearing aids  Color: medium brown   Earmold Impressions: right completed     Hearing aid order was mailed to [facility]  Patient will be contacted to schedule HAP upon RFF            Lubna Díaz, Audiology Intern  Pretty Allen , CCC-A  Clinical Audiologist

## 2018-08-22 DIAGNOSIS — N18.4 CHRONIC KIDNEY DISEASE, STAGE IV (SEVERE) (HCC): ICD-10-CM

## 2018-08-29 NOTE — PROGRESS NOTES
1:28pm 8/29/18    Right hearing aid arrived  Oticon Soledad2 FS  SN #[id number]  Warranty 9/27/20    Will innakita Bourgeois to schedule HAP with Guille Mcdonald

## 2018-08-30 ENCOUNTER — OFFICE VISIT (OUTPATIENT)
Dept: SURGERY | Facility: CLINIC | Age: 83
End: 2018-08-30
Payer: COMMERCIAL

## 2018-08-30 VITALS
WEIGHT: 186 LBS | BODY MASS INDEX: 27.55 KG/M2 | DIASTOLIC BLOOD PRESSURE: 74 MMHG | TEMPERATURE: 97.5 F | SYSTOLIC BLOOD PRESSURE: 140 MMHG | HEIGHT: 69 IN

## 2018-08-30 DIAGNOSIS — L72.3 SEBACEOUS CYST: Primary | ICD-10-CM

## 2018-08-30 PROBLEM — D17.1 LIPOMA OF BACK: Status: ACTIVE | Noted: 2018-08-30

## 2018-08-30 PROCEDURE — 99203 OFFICE O/P NEW LOW 30 MIN: CPT | Performed by: SURGERY

## 2018-08-30 PROCEDURE — 11404 EXC TR-EXT B9+MARG 3.1-4 CM: CPT | Performed by: SURGERY

## 2018-08-30 RX ORDER — AMOXICILLIN AND CLAVULANATE POTASSIUM 875; 125 MG/1; MG/1
1 TABLET, FILM COATED ORAL EVERY 12 HOURS SCHEDULED
Qty: 14 TABLET | Refills: 0 | Status: SHIPPED | OUTPATIENT
Start: 2018-08-30 | End: 2018-09-06

## 2018-08-30 NOTE — ASSESSMENT & PLAN NOTE
Impression  4 x 4 cm lesion mid upper back    Recommendation  Will plan for surgical excision  All risks benefits and alternatives were explained via the translation phone  All questions answered  Consent obtained  The Will also start on p o   Antibiotics

## 2018-08-30 NOTE — PROGRESS NOTES
All Office Visit - General Surgery  Nona Fatima MRN: [id number]  Encounter: [id number]    Assessment and Plan    Problem List Items Addressed This Visit     None          Chief Complaint:  Nona Fatima is a 81 y o  male who presents for Abscess    Subjective  Patient has had an upper midline back lesion for several months  No drainage  Complaining of pain    Minimal redness    Past Medical History  Past Medical History:   Diagnosis Date    Asthma     Cancer ([facility] [address] )     prostate    Diabetes mellitus ([facility] )     UTI (urinary tract infection)     RESOLVED 18 DEC 2012       Past Surgical History  Past Surgical History:   Procedure Laterality Date    APPENDECTOMY      BLADDER SURGERY      PROSTATE SURGERY         Family History  Family History   Problem Relation Age of Onset    Diabetes Mother         TYPE2    Diabetes Brother     Diabetes Family     Heart disease Family     Diabetes Daughter        Medications  Current Outpatient Prescriptions on File Prior to Visit   Medication Sig Dispense Refill    albuterol (5 mg/mL) 0 5 % nebulizer solution Inhale      Blood Glucose Monitoring Suppl (FREESTYLE FREEDOM LITE) w/Device KIT by Does not apply route 3 (three) times a day      cholecalciferol (VITAMIN D3) 1,000 units tablet Take 1 tablet (1,000 Units total) by mouth daily 90 tablet 1    clotrimazole (LOTRIMIN) 1 % cream Apply topically 2 (two) times a day 30 g 4    famotidine (PEPCID) 20 mg tablet Take 1 tablet (20 mg total) by mouth daily 30 tablet 0    Insulin Glargine (TOUJEO SOLOSTAR) injection pen 300 units/mL Inject 45 Units under the skin daily at bedtime       Insulin Pen Needle (B-D UF III MINI PEN NEEDLES) 31G X 5 MM MISC Inject under the skin 4 (four) times a day with insulin 400 each 1    Lancets (FREESTYLE) lancets by Does not apply route      metoclopramide (REGLAN) 5 mg tablet Take 1 tablet (5 mg total) by mouth 3 (three) times a day before meals 30 tablet 0    NOVOLOG FLEXPEN 100 units/mL injection pen inject 12 units subcutaneously three times a day with meals 15 mL 2    sertraline (ZOLOFT) 25 mg tablet Take 1 tablet (25 mg total) by mouth daily 30 tablet 0    sodium bicarbonate 650 mg tablet Take 2 tablets (1,300 mg total) by mouth 2 (two) times a day 360 tablet 3    aspirin 81 MG tablet Take 1 tablet (81 mg total) by mouth daily (Patient not taking: Reported on 8/30/2018 ) 90 tablet 2     No current facility-administered medications on file prior to visit  Allergies  Allergies   Allergen Reactions    Aspirin Itching       Review of Systems   HENT: Negative  Eyes: Negative  Respiratory: Positive for shortness of breath  Cardiovascular: Negative  Gastrointestinal: Negative  Endocrine: Negative  Genitourinary: Negative  Musculoskeletal: Negative  Skin:        Positive lesion on his upper back   Allergic/Immunologic: Negative  Neurological: Negative  Hematological: Negative  Psychiatric/Behavioral: Negative  All other systems reviewed and are negative  Objective  Vitals:    08/30/18 1022   BP: 140/74   Temp: 97 5 °F (36 4 °C)       Physical Exam   Constitutional: He is oriented to person, place, and time  He appears well-developed  HENT:   Head: Normocephalic and atraumatic  Eyes: Pupils are equal, round, and reactive to light  Neck: Normal range of motion  Cardiovascular: Normal rate, regular rhythm and normal heart sounds  Pulmonary/Chest: Effort normal and breath sounds normal    Abdominal: Soft  Bowel sounds are normal    Musculoskeletal: Normal range of motion  Neurological: He is alert and oriented to person, place, and time  Skin: Skin is warm and dry  4 x 4 cm lesion mid upper back  Minimal erythema  No drainage  Tender to palpation at   Psychiatric: He has a normal mood and affect

## 2018-09-04 ENCOUNTER — OFFICE VISIT (OUTPATIENT)
Dept: MULTI SPECIALTY CLINIC | Facility: CLINIC | Age: 83
End: 2018-09-04
Payer: COMMERCIAL

## 2018-09-04 VITALS
HEIGHT: 71 IN | SYSTOLIC BLOOD PRESSURE: 118 MMHG | BODY MASS INDEX: 24.81 KG/M2 | TEMPERATURE: 97.7 F | WEIGHT: 177.25 LBS | HEART RATE: 60 BPM | DIASTOLIC BLOOD PRESSURE: 70 MMHG

## 2018-09-04 DIAGNOSIS — E11.8 TYPE 2 DIABETES MELLITUS WITH COMPLICATION, UNSPECIFIED WHETHER LONG TERM INSULIN USE: ICD-10-CM

## 2018-09-04 DIAGNOSIS — L85.3 XEROSIS OF SKIN: Primary | ICD-10-CM

## 2018-09-04 PROCEDURE — 4040F PNEUMOC VAC/ADMIN/RCVD: CPT | Performed by: PODIATRIST

## 2018-09-04 PROCEDURE — 99212 OFFICE O/P EST SF 10 MIN: CPT | Performed by: PODIATRIST

## 2018-09-04 RX ORDER — AMMONIUM LACTATE 12 G/100G
LOTION TOPICAL 2 TIMES DAILY PRN
Qty: 400 G | Refills: 0 | Status: SHIPPED | OUTPATIENT
Start: 2018-09-04

## 2018-09-04 NOTE — PROGRESS NOTES
Podiatry Clinic Visit  Caron Almanza 81 y o  male MRN: [id number]  Encounter: [id number]    Assessment/Plan     Assessment:  1  DM 2 with no complications   2  Xerotic feet b/l     Plan:  - Patient was seen/examined  All questions and concerns addressed  - Rx for ammonium lactate for xerotic feet and advised to use as instructed   - Discussed importance of glycemic control, shoe gear, and proper diabetic foot care   - All questions and concerns addressed   - RTC in 1 year  History of Present Illness     HPI:  Caron Almanza is a 81 y o  male who presents for diabetic foot exam  Patient is accompanied by his wife who is also translating on his behalf  He states he does not have any issues related to feet  He is here because he is moving to [place] in few months and before he goes he wanted to have his feet checked out  The patient denies any nausea, vomiting, fever, chills, shortness of breath, or chest pains  Review of Systems   Constitutional: Negative  HENT: Negative  Eyes: Negative  Respiratory: Negative  Cardiovascular: Negative  Gastrointestinal: Negative  Musculoskeletal: Negative   Skin: Xerotic feet  Neurological: Negative          Historical Information   Past Medical History:   Diagnosis Date    Asthma     [facility]     prostate    [facility] mellitus ([facility] )     UTI (urinary tract infection)     RESOLVED 18 DEC 2012     Past Surgical History:   Procedure Laterality Date    APPENDECTOMY      BLADDER SURGERY      PROSTATE SURGERY       Social History   History   Alcohol Use No     Comment: "Hx of social alcohol use"      History   Drug Use No     History   Smoking Status    Never Smoker   Smokeless Tobacco    Never Used     Comment: 2ND HAND SMOKE EXPOSURE     Family History:   Family History   Problem Relation Age of Onset    Diabetes Mother         TYPE2    Diabetes Brother     Diabetes Family     Heart disease Family     Diabetes Daughter        Meds/Allergies (Not in a hospital admission)  Allergies   Allergen Reactions    Aspirin Itching       Objective     Current Vitals:   Blood Pressure: 118/70 (09/04/18 0841)  Pulse: 60 (09/04/18 0841)  Temperature: 97 7 °F (36 5 °C) (09/04/18 0841)  Temp Source: Oral (09/04/18 0841)  Height: 5' 10 5" (179 1 cm) (09/04/18 0841)  Weight - Scale: 80 4 kg (177 lb 4 oz) (09/04/18 0841)        /70 (BP Location: Right arm, Patient Position: Sitting, Cuff Size: Adult)   Pulse 60   Temp 97 7 °F (36 5 °C) (Oral)   Ht 5' 10 5" (1 791 m)   Wt 80 4 kg (177 lb 4 oz)   BMI 25 07 kg/m²       Lower Extremity Exam:    Musculoskeletal:  MMT is 5/5 to all compartments of the LE, +0/4 edema B/L, Digital ROM is intact,      Pulses:   R DP is +1/4, R PT is +1/4, L DP is +1/4, L PT is +1/4, CFT< 3sec to all digits  Pedal hair is Present     Skin:  No open Lesions  Skin of the LE is normal texture, turgor  No interspace maceration noted  Mildly xerotic feet b/l  Neurologic:  Gross sensation is intact   Protective sensation is Intact

## 2018-09-06 ENCOUNTER — OFFICE VISIT (OUTPATIENT)
Dept: LAB | Facility: HOSPITAL | Age: 83
End: 2018-09-06
Attending: SURGERY
Payer: COMMERCIAL

## 2018-09-06 ENCOUNTER — APPOINTMENT (OUTPATIENT)
Dept: LAB | Facility: HOSPITAL | Age: 83
End: 2018-09-06
Attending: SURGERY
Payer: COMMERCIAL

## 2018-09-06 DIAGNOSIS — D17.1 LIPOMA OF BACK: ICD-10-CM

## 2018-09-06 DIAGNOSIS — N18.4 CHRONIC KIDNEY DISEASE, STAGE IV (SEVERE) (HCC): ICD-10-CM

## 2018-09-06 LAB
ALBUMIN SERPL BCP-MCNC: 3.3 G/DL (ref 3.5–5)
ALP SERPL-CCNC: 146 U/L (ref 46–116)
ALT SERPL W P-5'-P-CCNC: 16 U/L (ref 12–78)
ANION GAP SERPL CALCULATED.3IONS-SCNC: 7 MMOL/L (ref 4–13)
AST SERPL W P-5'-P-CCNC: 11 U/L (ref 5–45)
ATRIAL RATE: 63 BPM
BASOPHILS # BLD AUTO: 0.06 THOUSANDS/ΜL (ref 0–0.1)
BASOPHILS NFR BLD AUTO: 1 % (ref 0–1)
BILIRUB SERPL-MCNC: 0.35 MG/DL (ref 0.2–1)
BUN SERPL-MCNC: 38 MG/DL (ref 5–25)
CALCIUM SERPL-MCNC: 8.8 MG/DL (ref 8.3–10.1)
CHLORIDE SERPL-SCNC: 117 MMOL/L (ref 100–108)
CO2 SERPL-SCNC: 18 MMOL/L (ref 21–32)
CREAT SERPL-MCNC: 3.03 MG/DL (ref 0.6–1.3)
CREAT UR-MCNC: 64.8 MG/DL
EOSINOPHIL # BLD AUTO: 0.12 THOUSAND/ΜL (ref 0–0.61)
EOSINOPHIL NFR BLD AUTO: 3 % (ref 0–6)
ERYTHROCYTE [DISTWIDTH] IN BLOOD BY AUTOMATED COUNT: 15.1 % (ref 11.6–15.1)
GFR SERPL CREATININE-BSD FRML MDRD: 18 ML/MIN/1.73SQ M
GLUCOSE P FAST SERPL-MCNC: 111 MG/DL (ref 65–99)
HCT VFR BLD AUTO: 36.3 % (ref 36.5–49.3)
HGB BLD-MCNC: 11.4 G/DL (ref 12–17)
IMM GRANULOCYTES # BLD AUTO: 0.01 THOUSAND/UL (ref 0–0.2)
IMM GRANULOCYTES NFR BLD AUTO: 0 % (ref 0–2)
LYMPHOCYTES # BLD AUTO: 2.04 THOUSANDS/ΜL (ref 0.6–4.47)
LYMPHOCYTES NFR BLD AUTO: 46 % (ref 14–44)
MAGNESIUM SERPL-MCNC: 2.1 MG/DL (ref 1.6–2.6)
MCH RBC QN AUTO: 27.8 PG (ref 26.8–34.3)
MCHC RBC AUTO-ENTMCNC: 31.4 G/DL (ref 31.4–37.4)
MCV RBC AUTO: 89 FL (ref 82–98)
MONOCYTES # BLD AUTO: 0.33 THOUSAND/ΜL (ref 0.17–1.22)
MONOCYTES NFR BLD AUTO: 7 % (ref 4–12)
NEUTROPHILS # BLD AUTO: 1.9 THOUSANDS/ΜL (ref 1.85–7.62)
NEUTS SEG NFR BLD AUTO: 43 % (ref 43–75)
NRBC BLD AUTO-RTO: 0 /100 WBCS
P AXIS: 59 DEGREES
PLATELET # BLD AUTO: 214 THOUSANDS/UL (ref 149–390)
PMV BLD AUTO: 10.2 FL (ref 8.9–12.7)
POTASSIUM SERPL-SCNC: 4.3 MMOL/L (ref 3.5–5.3)
PR INTERVAL: 170 MS
PROT SERPL-MCNC: 8 G/DL (ref 6.4–8.2)
PROT UR-MCNC: 41 MG/DL
PROT/CREAT UR: 0.63 MG/G{CREAT} (ref 0–0.1)
PTH-INTACT SERPL-MCNC: 65.9 PG/ML (ref 18.4–80.1)
QRS AXIS: -73 DEGREES
QRSD INTERVAL: 102 MS
QT INTERVAL: 418 MS
QTC INTERVAL: 427 MS
RBC # BLD AUTO: 4.1 MILLION/UL (ref 3.88–5.62)
SODIUM SERPL-SCNC: 142 MMOL/L (ref 136–145)
T WAVE AXIS: 52 DEGREES
VENTRICULAR RATE: 63 BPM
WBC # BLD AUTO: 4.46 THOUSAND/UL (ref 4.31–10.16)

## 2018-09-06 PROCEDURE — 83735 ASSAY OF MAGNESIUM: CPT

## 2018-09-06 PROCEDURE — 93005 ELECTROCARDIOGRAM TRACING: CPT

## 2018-09-06 PROCEDURE — 36415 COLL VENOUS BLD VENIPUNCTURE: CPT

## 2018-09-06 PROCEDURE — 85025 COMPLETE CBC W/AUTO DIFF WBC: CPT

## 2018-09-06 PROCEDURE — 80053 COMPREHEN METABOLIC PANEL: CPT

## 2018-09-06 PROCEDURE — 83970 ASSAY OF PARATHORMONE: CPT

## 2018-09-06 PROCEDURE — 82570 ASSAY OF URINE CREATININE: CPT

## 2018-09-06 PROCEDURE — 84156 ASSAY OF PROTEIN URINE: CPT

## 2018-09-06 PROCEDURE — 93010 ELECTROCARDIOGRAM REPORT: CPT | Performed by: INTERNAL MEDICINE

## 2018-09-13 ENCOUNTER — OFFICE VISIT (OUTPATIENT)
Dept: AUDIOLOGY | Age: 83
End: 2018-09-13
Payer: COMMERCIAL

## 2018-09-13 DIAGNOSIS — H90.3 SENSORINEURAL HEARING LOSS, BILATERAL: Primary | ICD-10-CM

## 2018-09-13 PROCEDURE — V5241 DISPENSING FEE, MONAURAL: HCPCS | Performed by: AUDIOLOGIST

## 2018-09-13 PROCEDURE — V5256 HEARING AID, DIGIT, MON, ITE: HCPCS | Performed by: AUDIOLOGIST

## 2018-09-13 NOTE — PROGRESS NOTES
Hearing Aid Fitting    Name:  Lopez Brandt  :  1935  Age:  81 y o  Date of Evaluation: 18     Oliva Henley is being see today to be fit with a new hearing aid  Patient is being fit with [address]TE  The hearing aid(s) were adjusted based on the patient's most recent audiogram and patient comfort  Patient reported good sound quality  Right Left   Make Oticon  ---   Model Soledad 2 [place]  ---   [place] [id number]  ---   Color Medium brown  ---   Dome/Mold  ---  ---   /Length  ---  ---   Battery  312  ---   Service Warranty  20  ---   L&D Warranty  19  ---     Care and cleaning of the hearing aids was reviewed  Filters and batteries were reviewed with the patient  Insertion and removal of the hearing aids was done  The patient practiced insertion and removal of the devices in the office, they demonstrated good ability to manipulate the hearing aids  Telephone use was reviewed with the patient  The patient expressed satisfaction with the hearing aids  Recommendation:   Follow up in two weeks         Sean Her, Audiology Intern  Pretty Gtz , CCC-A  Clinical Audiologist

## 2018-09-17 ENCOUNTER — ANESTHESIA EVENT (OUTPATIENT)
Dept: PERIOP | Facility: HOSPITAL | Age: 83
End: 2018-09-17
Payer: COMMERCIAL

## 2018-09-17 NOTE — ANESTHESIA PREPROCEDURE EVALUATION
Review of Systems/Medical History  Patient summary reviewed  Chart reviewed  No history of anesthetic complications     Cardiovascular  EKG reviewed, Exercise tolerance (METS): >4,  Hypertension controlled, PVD,   Comment: EKG: NSR, incomplete RBBB    Able to walk up 2 flights of stairs at home without stopping,  Pulmonary  Asthma , well controlled/ stable ,   Comment: Per patient, concern for worsened lung function on PFTs, currently has mild cough, no fever, no myalgias, no sputum production     GI/Hepatic    GERD poorly controlled,        Chronic kidney disease stage 4,   Comment: Baseline Cr 2 75, currently 3 03 on preop labs 9/6/18  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, 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     Endo/Other  Diabetes poorly controlled type 2 Insulin,      GYN       Hematology  Anemia renal failure anemia,     Musculoskeletal    Comment: 4 cm x 4cm midline abscess on thoracic back x4 months, on PO augmentin      Neurology  Negative neurology ROS      Psychology   Depression , being treated for depression,              Physical Exam    Airway    Mallampati score: I  TM Distance: >3 FB  Neck ROM: full     Dental       Cardiovascular  Rhythm: regular, Rate: normal,     Pulmonary  Breath sounds clear to auscultation,     Other Findings  edentulous      Anesthesia Plan  ASA Score- 3     Anesthesia Type- IV sedation with anesthesia with ASA Monitors  Additional Monitors:   Airway Plan:     Comment: Patient consented for IV sedation, possible general anesthesia with endotracheal tube  History, medications, allergies, and labs reviewed  All questions and concerned answered        Plan Factors- Instructed to abstain from smoking on day of procedure: never smoker    Patient did not smoke on day of surgery  Induction- intravenous  Postoperative Plan-     Informed Consent- Anesthetic plan and risks discussed with patient  I personally reviewed this patient with the CRNA   Discussed and agreed on the Anesthesia Plan with the BRENDAN Carrillo

## 2018-09-18 ENCOUNTER — ANESTHESIA (OUTPATIENT)
Dept: PERIOP | Facility: HOSPITAL | Age: 83
End: 2018-09-18
Payer: COMMERCIAL

## 2018-09-18 ENCOUNTER — HOSPITAL ENCOUNTER (OUTPATIENT)
Facility: HOSPITAL | Age: 83
Setting detail: OUTPATIENT SURGERY
Discharge: HOME | End: 2018-09-18
Attending: SURGERY | Admitting: SURGERY
Payer: COMMERCIAL

## 2018-09-18 VITALS
RESPIRATION RATE: 18 BRPM | DIASTOLIC BLOOD PRESSURE: 58 MMHG | HEART RATE: 66 BPM | OXYGEN SATURATION: 98 % | WEIGHT: 170 LBS | BODY MASS INDEX: 24.34 KG/M2 | TEMPERATURE: 98.4 F | HEIGHT: 70 IN | SYSTOLIC BLOOD PRESSURE: 103 MMHG

## 2018-09-18 DIAGNOSIS — D17.1 LIPOMA OF BACK: ICD-10-CM

## 2018-09-18 LAB
GLUCOSE SERPL-MCNC: 104 MG/DL (ref 65–140)
GLUCOSE SERPL-MCNC: 93 MG/DL (ref 65–140)

## 2018-09-18 PROCEDURE — 88304 TISSUE EXAM BY PATHOLOGIST: CPT | Performed by: PATHOLOGY

## 2018-09-18 PROCEDURE — 21931 EXC BACK LES SC 3 CM/>: CPT | Performed by: SURGERY

## 2018-09-18 PROCEDURE — 82948 REAGENT STRIP/BLOOD GLUCOSE: CPT

## 2018-09-18 PROCEDURE — 87075 CULTR BACTERIA EXCEPT BLOOD: CPT | Performed by: SURGERY

## 2018-09-18 PROCEDURE — 87070 CULTURE OTHR SPECIMN AEROBIC: CPT | Performed by: SURGERY

## 2018-09-18 PROCEDURE — 87205 SMEAR GRAM STAIN: CPT | Performed by: SURGERY

## 2018-09-18 RX ORDER — FENTANYL CITRATE/PF 50 MCG/ML
12.5 SYRINGE (ML) INJECTION
Status: DISCONTINUED | OUTPATIENT
Start: 2018-09-18 | End: 2018-09-18 | Stop reason: HOSPADM

## 2018-09-18 RX ORDER — OXYCODONE HYDROCHLORIDE 5 MG/1
5 TABLET ORAL EVERY 4 HOURS PRN
Status: DISCONTINUED | OUTPATIENT
Start: 2018-09-18 | End: 2018-09-18 | Stop reason: HOSPADM

## 2018-09-18 RX ORDER — FENTANYL CITRATE 50 UG/ML
INJECTION, SOLUTION INTRAMUSCULAR; INTRAVENOUS AS NEEDED
Status: DISCONTINUED | OUTPATIENT
Start: 2018-09-18 | End: 2018-09-18 | Stop reason: SURG

## 2018-09-18 RX ORDER — SODIUM CHLORIDE 9 MG/ML
INJECTION, SOLUTION INTRAVENOUS CONTINUOUS PRN
Status: DISCONTINUED | OUTPATIENT
Start: 2018-09-18 | End: 2018-09-18 | Stop reason: SURG

## 2018-09-18 RX ORDER — PROPOFOL 10 MG/ML
INJECTION, EMULSION INTRAVENOUS CONTINUOUS PRN
Status: DISCONTINUED | OUTPATIENT
Start: 2018-09-18 | End: 2018-09-18 | Stop reason: SURG

## 2018-09-18 RX ORDER — MAGNESIUM HYDROXIDE 1200 MG/15ML
LIQUID ORAL AS NEEDED
Status: DISCONTINUED | OUTPATIENT
Start: 2018-09-18 | End: 2018-09-18 | Stop reason: HOSPADM

## 2018-09-18 RX ORDER — SODIUM CHLORIDE 9 MG/ML
20 INJECTION, SOLUTION INTRAVENOUS CONTINUOUS
Status: DISCONTINUED | OUTPATIENT
Start: 2018-09-18 | End: 2018-09-18 | Stop reason: HOSPADM

## 2018-09-18 RX ORDER — ONDANSETRON 2 MG/ML
INJECTION INTRAMUSCULAR; INTRAVENOUS AS NEEDED
Status: DISCONTINUED | OUTPATIENT
Start: 2018-09-18 | End: 2018-09-18 | Stop reason: SURG

## 2018-09-18 RX ORDER — ONDANSETRON 2 MG/ML
4 INJECTION INTRAMUSCULAR; INTRAVENOUS ONCE AS NEEDED
Status: DISCONTINUED | OUTPATIENT
Start: 2018-09-18 | End: 2018-09-18 | Stop reason: HOSPADM

## 2018-09-18 RX ORDER — CEFAZOLIN SODIUM 1 G/3ML
INJECTION, POWDER, FOR SOLUTION INTRAMUSCULAR; INTRAVENOUS AS NEEDED
Status: DISCONTINUED | OUTPATIENT
Start: 2018-09-18 | End: 2018-09-18 | Stop reason: SURG

## 2018-09-18 RX ADMIN — CEFAZOLIN SODIUM 2000 MG: 2 SOLUTION INTRAVENOUS at 07:54

## 2018-09-18 RX ADMIN — CEFAZOLIN 2000 MG: 1 INJECTION, POWDER, FOR SOLUTION INTRAVENOUS at 08:00

## 2018-09-18 RX ADMIN — SODIUM CHLORIDE: 0.9 INJECTION, SOLUTION INTRAVENOUS at 07:35

## 2018-09-18 RX ADMIN — LIDOCAINE HYDROCHLORIDE 30 MG: 20 INJECTION, SOLUTION INTRAVENOUS at 08:03

## 2018-09-18 RX ADMIN — FENTANYL CITRATE 25 MCG: 50 INJECTION, SOLUTION INTRAMUSCULAR; INTRAVENOUS at 08:15

## 2018-09-18 RX ADMIN — ONDANSETRON 4 MG: 2 INJECTION INTRAMUSCULAR; INTRAVENOUS at 08:14

## 2018-09-18 RX ADMIN — PROPOFOL 80 MCG/KG/MIN: 10 INJECTION, EMULSION INTRAVENOUS at 08:03

## 2018-09-18 RX ADMIN — FENTANYL CITRATE 25 MCG: 50 INJECTION, SOLUTION INTRAMUSCULAR; INTRAVENOUS at 08:03

## 2018-09-18 RX ADMIN — FENTANYL CITRATE 25 MCG: 50 INJECTION, SOLUTION INTRAMUSCULAR; INTRAVENOUS at 09:10

## 2018-09-18 RX ADMIN — PROPOFOL 60 MCG/KG/MIN: 10 INJECTION, EMULSION INTRAVENOUS at 08:37

## 2018-09-18 NOTE — H&P
Updated H&P    No new findings from original H&P    Will proceed with upper back mass excision  All R/B/A discussed with patient  All questions answered  Translation via his grandson

## 2018-09-18 NOTE — ANESTHESIA POSTPROCEDURE EVALUATION
Post-Op Assessment Note      CV Status:  Stable    Mental Status:  Alert and awake    Hydration Status:  Euvolemic    PONV Controlled:  Controlled    Airway Patency:  Patent    Post Op Vitals Reviewed: Yes          Staff: CRNA           BP   98/55   Temp      Pulse  62   Resp      SpO2   97

## 2018-09-18 NOTE — OP NOTE
OPERATIVE REPORT  PATIENT NAME: Ki Guerrero    :  1935  MRN: [id number]  Pt Location: BE OR ROOM 08    SURGERY DATE: 2018    Surgeon(s) and Role:     * Orlando Quinonez DO - [address], MD - Assisting    Preop Diagnosis:  Mass of the upper back    Post-Op Diagnosis Codes: Mass of the upper back, probably sebaceous cyst    Procedure(s) (LRB):  EXCISION SEBACEOUS CYST UPPER BACK (N/A)    Specimen(s):  ID Type Source Tests Collected by Time Destination   1 : BACK CYST (UPPER MID BACK) Tissue Cyst TISSUE EXAM Orlando Quinonez DO 2018 0829    A : back wound Wound Back ANAEROBIC CULTURE AND GRAM STAIN, WOUND CULTURE Orlando Quinonez DO 2018 0815        Estimated Blood Loss:   Minimal    Drains:  Urostomy Ureterostomy right RLQ (Active)   Number of days: 65       Anesthesia Type:   Conscious sedation local anesthetic    Operative Indications:  Lipoma of back [D17 1]      Operative Findings:  2 x 2 x 2 cm cyst of upper back    Complications:   None    Procedure and Technique:  After all risks and benefits were thoroughly explained and all questions answered, consent had been obtained for the above procedure  Patient states that the cyst had ruptured earlier in the week with some drainage  However over the last several days with been no drainage  On examination in preoperative holding, there was no drainage or opening  Patient understood that there was increased risk of postoperative infection due to the drainage however it was felt that we should proceed with excision and if a moderate amount of gonzalo pus was identified at surgery, the wound would be left open and packed  Patient was identified in preoperative holding  He was then brought the operating suite and given conscious sedation by the anesthesia department  He was placed in the prone position  His entire upper back was then prepped and draped in the usual sterile fashion    At this time all members of the team stopped and a time-out was performed  Everyone was in agreement with the above plan  Marcaine 0 5% was then infiltrated directly above the cyst   A 3 cm transverse incision was made directly over the cyst   The cyst was identified  There was a minimal amount of drainage from the cyst   It was then excised using Metzenbaum scissors and a scalpel  Cultures were taken of the area  The cyst measured approximately 2 x 2 x 2 cm and was in the subcutaneous layer  The wound was copiously irrigated  Subcutaneous layer was reapproximated using 3 0 Vicryl  The subcuticular layer was reapproximated using 4 O Monocryl  Histocryl was applied on the skin  The patient tolerated procedure well  There was no apparent complication  With recovery in satisfactory condition  All needle instrument sponge counts were correct at the end the procedure  I was present for the entire procedure    Patient Disposition:  PACU     SIGNATURE: Keyla Tello DO  DATE: September 18, 2018  TIME: 12:48 PM

## 2018-09-21 LAB
BACTERIA SPEC ANAEROBE CULT: NO GROWTH
BACTERIA WND AEROBE CULT: NO GROWTH
GRAM STN SPEC: NORMAL

## 2018-09-28 ENCOUNTER — OFFICE VISIT (OUTPATIENT)
Dept: SURGERY | Facility: CLINIC | Age: 83
End: 2018-09-28

## 2018-09-28 VITALS — HEIGHT: 70 IN | WEIGHT: 169.3 LBS | BODY MASS INDEX: 24.24 KG/M2

## 2018-09-28 DIAGNOSIS — L02.91 ABSCESS: Primary | ICD-10-CM

## 2018-09-28 PROCEDURE — 99024 POSTOP FOLLOW-UP VISIT: CPT | Performed by: SURGERY

## 2018-09-28 RX ORDER — CEPHALEXIN 500 MG/1
500 CAPSULE ORAL EVERY 6 HOURS SCHEDULED
Qty: 20 CAPSULE | Refills: 0 | Status: SHIPPED | OUTPATIENT
Start: 2018-09-28 | End: 2018-10-03

## 2018-09-28 NOTE — PROGRESS NOTES
Incision and Drainage  Date/Time: 9/28/2018 9:31 AM  Performed by: Evan Reno  Authorized by: Evan Reno     Patient location:  Clinic  Consent:     Consent obtained:  Verbal    Consent given by:  Patient    Risks discussed:  Incomplete drainage, bleeding and infection  Universal protocol:     Patient identity confirmed:  Verbally with patient  Location:     Type:  Abscess    Location: back  Pre-procedure details:     Skin preparation:  Chloraprep  Anesthesia (see MAR for exact dosages): Anesthesia method:  Local infiltration    Local anesthetic:  Lidocaine 1% WITH epi  Procedure details:     Complexity:  Simple    Needle aspiration: no      Incision types:  Single straight    Scalpel blade:  11    Approach:  Open    Incision depth:  Subcutaneous    Drainage characteristics: seropurulent  Drainage amount: 15cc  Wound treatment:  Wound left open and packing placed    Packing materials:  1/4 in gauze  Post-procedure details:     Patient tolerance of procedure:   Tolerated well, no immediate complications

## 2018-09-28 NOTE — PROGRESS NOTES
Office Visit - General Surgery  Rina Maynard MRN: [id number]  Encounter: [id number]    Assessment and Plan  83M s/p excision of back cyst now with abscess  - wound fluctuant w seropurulent discharge  - I&D in office  - PO keflex x5d  - RTC x1week for wound check    Problem List Items Addressed This Visit     None          Chief Complaint:  Rina Maynard is a 81 y o  male who presents for Wound Check (p/o excision of lipoma on back)    Subjective  83M s/p excision of back mass on 9/18/18  Pt  C/o increased swelling and pain to surgical site and noted cloudy drainage  No f/c, no n/v, ankita PO diet      Past Medical History  Past Medical History:   Diagnosis Date    Anemia     Asthma     Cancer ([facility] )     prostate    Chronic kidney disease     Diabetes mellitus ([facility] )     GERD (gastroesophageal reflux disease)     UTI (urinary tract infection)     RESOLVED 18 DEC 2012       Past Surgical History  Past Surgical History:   Procedure Laterality Date    APPENDECTOMY      BLADDER SURGERY      CHANGE URETEROSTOMY TUBE      DC EXC SKIN BENIG >4 CM TRUNK,ARM,LEG N/A 9/18/2018    Procedure: EXCISION SEBACEOUS CYST UPPER BACK;  Surgeon: Zulema Resendez DO;  [facility]: [facility];  Service: General    PROSTATE SURGERY         Family History  Family History   Problem Relation Age of Onset    Diabetes Mother         TYPE2    Diabetes Brother     Diabetes Family     Heart disease Family     Diabetes Daughter        Medications  Current Outpatient Prescriptions on File Prior to Visit   Medication Sig Dispense Refill    albuterol (5 mg/mL) 0 5 % nebulizer solution Inhale      ammonium lactate (LAC-HYDRIN) 12 % lotion Apply topically 2 (two) times a day as needed for dry skin 400 g 0    Blood Glucose Monitoring Suppl (FREESTYLE FREEDOM LITE) w/Device KIT by Does not apply route 3 (three) times a day      cholecalciferol (VITAMIN D3) 1,000 units tablet Take 1 tablet (1,000 Units total) by mouth daily 90 tablet 1    clotrimazole (LOTRIMIN) 1 % cream Apply topically 2 (two) times a day 30 g 4    famotidine (PEPCID) 20 mg tablet Take 1 tablet (20 mg total) by mouth daily 30 tablet 0    Insulin Glargine (TOUJEO SOLOSTAR) injection pen 300 units/mL Inject 45 Units under the skin daily at bedtime       Insulin Pen Needle (B-D UF III MINI PEN NEEDLES) 31G X 5 MM MISC Inject under the skin 4 (four) times a day with insulin 400 each 1    Lancets (FREESTYLE) lancets by Does not apply route      metoclopramide (REGLAN) 5 mg tablet Take 1 tablet (5 mg total) by mouth 3 (three) times a day before meals 30 tablet 0    NOVOLOG FLEXPEN 100 units/mL injection pen inject 12 units subcutaneously three times a day with meals 15 mL 2    sertraline (ZOLOFT) 25 mg tablet Take 1 tablet (25 mg total) by mouth daily 30 tablet 0    sodium bicarbonate 650 mg tablet Take 2 tablets (1,300 mg total) by mouth 2 (two) times a day 360 tablet 3     No current facility-administered medications on file prior to visit  Allergies  Allergies   Allergen Reactions    Aspirin Itching       Review of Systems   Constitutional: Negative  HENT: Negative  Eyes: Negative  Respiratory: Negative  Cardiovascular: Negative  Gastrointestinal: Negative  Skin: Negative for rash  Back incision w cloudy drainage and redness   Neurological: Negative  Objective  There were no vitals filed for this visit  Physical Exam   Constitutional: He is oriented to person, place, and time  He appears well-developed  HENT:   Head: Normocephalic and atraumatic  Eyes: Pupils are equal, round, and reactive to light  Neck: Neck supple  Cardiovascular: Normal rate and regular rhythm  Pulmonary/Chest: Breath sounds normal    Abdominal: Soft  He exhibits no distension  There is no tenderness  There is no rebound  Neurological: He is alert and oriented to person, place, and time  Skin:   Back incision with fluctuance and erythema   Minimal seropurulent discharge

## 2018-10-02 ENCOUNTER — OFFICE VISIT (OUTPATIENT)
Dept: AUDIOLOGY | Age: 83
End: 2018-10-02

## 2018-10-02 DIAGNOSIS — H90.3 SENSORINEURAL HEARING LOSS, BILATERAL: Primary | ICD-10-CM

## 2018-10-05 ENCOUNTER — OFFICE VISIT (OUTPATIENT)
Dept: SURGERY | Facility: CLINIC | Age: 83
End: 2018-10-05

## 2018-10-05 VITALS
TEMPERATURE: 96.8 F | DIASTOLIC BLOOD PRESSURE: 68 MMHG | HEIGHT: 70 IN | WEIGHT: 181 LBS | SYSTOLIC BLOOD PRESSURE: 108 MMHG | BODY MASS INDEX: 25.91 KG/M2

## 2018-10-05 DIAGNOSIS — D17.1 LIPOMA OF BACK: Primary | ICD-10-CM

## 2018-10-05 PROCEDURE — 99024 POSTOP FOLLOW-UP VISIT: CPT | Performed by: SURGERY

## 2018-10-05 NOTE — PROGRESS NOTES
Office Visit - General Surgery  Bishop Alexander MRN: [id number]  Encounter: [id number]    Assessment and Plan    Problem List Items Addressed This Visit        Other    Lipoma of back - Primary     Wound healed  No follow up necessary  Chief Complaint:  Bishop Alexander is a 81 y o  male who presents for Wound Check (Wound check)    Subjective  Removal of lipoma right back with subsequent development of minor soft tissue infection which required incision/drainage in the office      Past Medical History  Past Medical History:   Diagnosis Date    Anemia     Asthma     Cancer ([facility] )     prostate    Chronic kidney disease     Diabetes mellitus ([facility] )     GERD (gastroesophageal reflux disease)     UTI (urinary tract infection)     RESOLVED 18 DEC 2012       Past Surgical History  Past Surgical History:   Procedure Laterality Date    APPENDECTOMY      BLADDER SURGERY      CHANGE URETEROSTOMY TUBE      VA EXC SKIN BENIG >4 CM TRUNK,ARM,LEG N/A 9/18/2018    Procedure: EXCISION SEBACEOUS CYST UPPER BACK;  Surgeon: Charlotet Coates DO;  [facility];  Service: General    PROSTATE SURGERY         Family History  Family History   Problem Relation Age of Onset    Diabetes Mother         TYPE2    Diabetes Brother     Diabetes Family     Heart disease Family     Diabetes Daughter        Medications  Current Outpatient Prescriptions on File Prior to Visit   Medication Sig Dispense Refill    albuterol (5 mg/mL) 0 5 % nebulizer solution Inhale      ammonium lactate (LAC-HYDRIN) 12 % lotion Apply topically 2 (two) times a day as needed for dry skin 400 g 0    Blood Glucose Monitoring Suppl (FREESTYLE FREEDOM LITE) w/Device KIT by Does not apply route 3 (three) times a day      cholecalciferol (VITAMIN D3) 1,000 units tablet Take 1 tablet (1,000 Units total) by mouth daily 90 tablet 1    clotrimazole (LOTRIMIN) 1 % cream Apply topically 2 (two) times a day 30 g 4    famotidine (PEPCID) 20 mg tablet Take 1 tablet (20 mg total) by mouth daily 30 tablet 0    Insulin Glargine (TOUJEO SOLOSTAR) injection pen 300 units/mL Inject 45 Units under the skin daily at bedtime       Insulin Pen Needle (B-D UF III MINI PEN NEEDLES) 31G X 5 MM MISC Inject under the skin 4 (four) times a day with insulin 400 each 1    Lancets (FREESTYLE) lancets by Does not apply route      metoclopramide (REGLAN) 5 mg tablet Take 1 tablet (5 mg total) by mouth 3 (three) times a day before meals 30 tablet 0    NOVOLOG FLEXPEN 100 units/mL injection pen inject 12 units subcutaneously three times a day with meals 15 mL 2    sertraline (ZOLOFT) 25 mg tablet Take 1 tablet (25 mg total) by mouth daily 30 tablet 0    sodium bicarbonate 650 mg tablet Take 2 tablets (1,300 mg total) by mouth 2 (two) times a day 360 tablet 3     No current facility-administered medications on file prior to visit  Allergies  Allergies   Allergen Reactions    Aspirin Itching       Review of Systems   Constitutional: Negative  Inability to Sleep   HENT: Negative  Eyes: Negative  Respiratory: Negative  Cough: [id number]  Cardiovascular: Negative  Gastrointestinal: Negative  Endocrine: Negative  Genitourinary: Negative  Musculoskeletal: Negative  Skin: Negative  Allergic/Immunologic: Negative  Neurological: Negative  Hematological: Negative  Psychiatric/Behavioral: Negative  Objective  Vitals:    10/05/18 0912   BP: 108/68   Temp: (!) 96 8 °F (36 °C)       Physical Exam   Skin: Skin is warm and dry  No rash noted  No erythema  No pallor  3-4 cm surgical wound right upper back without drainage, erythema  Non tender to palpation w/o fluctuance to palpation

## 2018-11-05 ENCOUNTER — TELEPHONE (OUTPATIENT)
Dept: INTERNAL MEDICINE CLINIC | Facility: CLINIC | Age: 83
End: 2018-11-05

## 2018-11-05 DIAGNOSIS — K21.9 GERD (GASTROESOPHAGEAL REFLUX DISEASE): ICD-10-CM

## 2018-11-05 DIAGNOSIS — E11.8 TYPE 2 DIABETES MELLITUS WITH COMPLICATION, WITH LONG-TERM CURRENT USE OF INSULIN (HCC): ICD-10-CM

## 2018-11-05 DIAGNOSIS — Z79.4 TYPE 2 DIABETES MELLITUS WITH COMPLICATION, WITH LONG-TERM CURRENT USE OF INSULIN (HCC): ICD-10-CM

## 2018-11-05 DIAGNOSIS — E11.9 DIABETES MELLITUS, TYPE II, INSULIN DEPENDENT (HCC): ICD-10-CM

## 2018-11-05 DIAGNOSIS — Z79.4 DIABETES MELLITUS, TYPE II, INSULIN DEPENDENT (HCC): ICD-10-CM

## 2018-11-05 DIAGNOSIS — E87.2 METABOLIC ACIDEMIA: ICD-10-CM

## 2018-11-05 DIAGNOSIS — N18.4 CHRONIC KIDNEY DISEASE, STAGE IV (SEVERE) (HCC): ICD-10-CM

## 2018-11-05 DIAGNOSIS — F32.A DEPRESSION: ICD-10-CM

## 2018-11-05 DIAGNOSIS — R06.02 SHORTNESS OF BREATH: Primary | ICD-10-CM

## 2018-11-05 NOTE — TELEPHONE ENCOUNTER
PATIENT IS REQUESTING REFILLS FOR 90 DAYS HE'S IS GOING TO [place] FOR 2 MONTHS AND HE NEEDS THE MEDICATIONS

## 2018-11-06 RX ORDER — SODIUM BICARBONATE 650 MG/1
1300 TABLET ORAL 2 TIMES DAILY
Qty: 360 TABLET | Refills: 1 | Status: SHIPPED | OUTPATIENT
Start: 2018-11-06

## 2018-11-06 RX ORDER — LANCETS 28 GAUGE
EACH MISCELLANEOUS AS NEEDED
Qty: 400 EACH | Refills: 1 | Status: SHIPPED | OUTPATIENT
Start: 2018-11-06 | End: 2018-11-07 | Stop reason: SDUPTHER

## 2018-11-06 RX ORDER — SERTRALINE HYDROCHLORIDE 25 MG/1
25 TABLET, FILM COATED ORAL DAILY
Qty: 90 TABLET | Refills: 1 | Status: SHIPPED | OUTPATIENT
Start: 2018-11-06

## 2018-11-06 RX ORDER — METOCLOPRAMIDE 5 MG/1
5 TABLET ORAL
Qty: 270 TABLET | Refills: 1 | Status: SHIPPED | OUTPATIENT
Start: 2018-11-06

## 2018-11-07 ENCOUNTER — TELEPHONE (OUTPATIENT)
Dept: INTERNAL MEDICINE CLINIC | Facility: CLINIC | Age: 83
End: 2018-11-07

## 2018-11-07 DIAGNOSIS — R06.02 SOB (SHORTNESS OF BREATH): Primary | ICD-10-CM

## 2018-11-07 RX ORDER — LANCETS 28 GAUGE
EACH MISCELLANEOUS 3 TIMES DAILY
Qty: 400 EACH | Refills: 1 | Status: SHIPPED | OUTPATIENT
Start: 2018-11-07

## 2018-11-07 RX ORDER — ALBUTEROL SULFATE 2.5 MG/3ML
2.5 SOLUTION RESPIRATORY (INHALATION) EVERY 4 HOURS PRN
Qty: 60 VIAL | Refills: 1 | Status: SHIPPED | OUTPATIENT
Start: 2018-11-07

## 2018-11-07 NOTE — TELEPHONE ENCOUNTER
Oliva Gotti from [employer] called regarding the Lancets Freestyle Lancets  It says as needed  They need directions  Please correct and resend

## 2018-11-07 NOTE — TELEPHONE ENCOUNTER
Pharmacy called in due to the fact that apparently the albuterol you ordered was the concentrated viles that need to be diluted , can you please send in the regular pre-mixed viles 2 5 mg / 3mL per insurance  Thank you !

## 2018-11-26 ENCOUNTER — OFFICE VISIT (OUTPATIENT)
Dept: INTERNAL MEDICINE CLINIC | Facility: CLINIC | Age: 83
End: 2018-11-26
Payer: COMMERCIAL

## 2018-11-26 VITALS
TEMPERATURE: 98.6 F | SYSTOLIC BLOOD PRESSURE: 132 MMHG | HEIGHT: 70 IN | DIASTOLIC BLOOD PRESSURE: 60 MMHG | HEART RATE: 72 BPM | WEIGHT: 180.78 LBS | BODY MASS INDEX: 25.88 KG/M2

## 2018-11-26 DIAGNOSIS — E11.9 DIABETES MELLITUS, TYPE II, INSULIN DEPENDENT (HCC): ICD-10-CM

## 2018-11-26 DIAGNOSIS — Z79.4 TYPE 2 DIABETES MELLITUS WITH DIABETIC AUTONOMIC NEUROPATHY, WITH LONG-TERM CURRENT USE OF INSULIN (HCC): Primary | ICD-10-CM

## 2018-11-26 DIAGNOSIS — Z79.4 DIABETES MELLITUS, TYPE II, INSULIN DEPENDENT (HCC): ICD-10-CM

## 2018-11-26 DIAGNOSIS — R06.02 SOB (SHORTNESS OF BREATH): ICD-10-CM

## 2018-11-26 DIAGNOSIS — E11.43 TYPE 2 DIABETES MELLITUS WITH DIABETIC AUTONOMIC NEUROPATHY, WITH LONG-TERM CURRENT USE OF INSULIN (HCC): Primary | ICD-10-CM

## 2018-11-26 DIAGNOSIS — Z23 NEED FOR INFLUENZA VACCINATION: ICD-10-CM

## 2018-11-26 LAB — SL AMB POCT HEMOGLOBIN AIC: 8.9

## 2018-11-26 PROCEDURE — 83036 HEMOGLOBIN GLYCOSYLATED A1C: CPT | Performed by: INTERNAL MEDICINE

## 2018-11-26 PROCEDURE — 1036F TOBACCO NON-USER: CPT | Performed by: INTERNAL MEDICINE

## 2018-11-26 PROCEDURE — 3008F BODY MASS INDEX DOCD: CPT | Performed by: INTERNAL MEDICINE

## 2018-11-26 PROCEDURE — 99213 OFFICE O/P EST LOW 20 MIN: CPT | Performed by: INTERNAL MEDICINE

## 2018-11-26 RX ORDER — ALBUTEROL SULFATE 90 UG/1
2 AEROSOL, METERED RESPIRATORY (INHALATION) EVERY 6 HOURS PRN
Qty: 18 G | Refills: 0 | Status: SHIPPED | OUTPATIENT
Start: 2018-11-26

## 2018-11-26 NOTE — ASSESSMENT & PLAN NOTE
Lab Results   Component Value Date    HGBA1C 8 9 11/26/2018       No results for input(s): POCGLU in the last 72 hours      · I stressed the importance of medication compliance with the patient and his spouse  · Will increase Toujeo to 50 units at night (former dose 45 units)  · Increase NovoLog to 15 units t i d  with meals (former dose 12 units)  · Continue checking blood sugar 3 times a day  · Advise patient to start recording his blood glucose after he returns from [facility] and following up with our clinic in January to discuss sugar control and how to properly take insulin  · Goal A1c given patient's age is <8 0%

## 2018-11-26 NOTE — PATIENT INSTRUCTIONS
· Increase insulin to 50 units of toujeo at night  · Increase novolog to 15 units with meals  · Call clinic if having hypoglycemia

## 2018-11-26 NOTE — ASSESSMENT & PLAN NOTE
· Possible component of mild COPD per previous pulmonary function tests  · Will prescribe albuterol inhaler so patient can use this in [place]

## 2018-11-26 NOTE — PROGRESS NOTES
Assessment/Plan:     Problem List Items Addressed This Visit        Endocrine    DM type 2 (diabetes mellitus, type 2) ([facility] ) - Primary     Lab Results   Component Value Date    HGBA1C 8 9 11/26/2018       No results for input(s): POCGLU in the last 72 hours  · I stressed the importance of medication compliance with the patient and his spouse  · Will increase Toujeo to 50 units at night (former dose 45 units)  · Increase NovoLog to 15 units t i d  with meals (former dose 12 units)  · Continue checking blood sugar 3 times a day  · Advise patient to start recording his blood glucose after he returns from [place] and following up with our clinic in January to discuss sugar control and how to properly take insulin  · Goal A1c given patient's age is <8 0%         Relevant Medications    Insulin Glargine (TOUJEO SOLOSTAR) 300 units/mL CONCETRATED U-300 injection pen    insulin aspart (NOVOLOG FLEXPEN) 100 Units/mL injection pen    Other Relevant Orders    POCT hemoglobin A1c (Completed)       Other    SOB (shortness of breath)     · Possible component of mild COPD per previous pulmonary function tests  · Will prescribe albuterol inhaler so patient can use this in [place]         Relevant Medications    albuterol (PROVENTIL HFA) 90 mcg/act inhaler      Other Visit Diagnoses     Need for influenza vaccination        Relevant Orders    influenza vaccine, 1208-3359, high-dose, PF 0 5 mL, for patients 65 yr+ (FLUZONE HIGH-DOSE)    Diabetes mellitus, type II, insulin dependent (HCC)        Relevant Medications    Insulin Glargine (TOUJEO SOLOSTAR) 300 units/mL CONCETRATED U-300 injection pen    insulin aspart (NOVOLOG FLEXPEN) 100 Units/mL injection pen            Subjective:      Patient ID: Ifeoma Rosario is a 81 y o  male  HPI  Pt here for diabetes check  No problem with medicine  Reports compliance with his medicine  Checks sugar 3 times a day  Last night his sugar was 125    Did not check today since he did not eat  Requested 3 months supply of his medicines since he is leaving for [facility] on 12/5  However his insurance company did not cover for a 3-month supply  At times in the morning, blood sugar can get over 200  A1C in office last time was 7 7%  Today it is 8 9%  Requesting refill of albuterol before he goes to [facility]      The following portions of the patient's history were reviewed and updated as appropriate: allergies, current medications, past family history, past medical history, past social history, past surgical history and problem list     Review of Systems   Constitutional: Negative  Respiratory: Positive for shortness of breath  Cardiovascular: Negative for chest pain  Gastrointestinal: Negative for abdominal pain and nausea  Objective:    /60 (BP Location: Right arm, Patient Position: Sitting, Cuff Size: Adult)   Pulse 72   Temp 98 6 °F (37 °C) (Oral)   Ht 5' 10" (1 778 m)   Wt 82 kg (180 lb 12 4 oz)   BMI 25 94 kg/m²        Physical Exam   Constitutional: He appears well-developed and well-nourished  No distress  Cardiovascular: Normal rate, regular rhythm, normal heart sounds and intact distal pulses  Pulses are weak pulses  Pulses:       Dorsalis pedis pulses are 0 on the right side, and 0 on the left side  Posterior tibial pulses are 0 on the right side, and 0 on the left side  Pulmonary/Chest: Effort normal and breath sounds normal    Feet:   Right Foot:   Skin Integrity: Positive for dry skin  Negative for ulcer, skin breakdown, erythema, warmth or callus  Left Foot:   Skin Integrity: Positive for dry skin  Negative for ulcer, skin breakdown, erythema, warmth or callus  Neurological: He is alert  Skin: He is not diaphoretic  Diabetic Foot Exam    Patient's shoes and socks removed  Right Foot/Ankle   Right Foot Inspection  Skin Exam: skin intact and dry skin no warmth, no callus, no erythema, no maceration, no abnormal color, no pre-ulcer, no ulcer and no callus                            Sensory       Monofilament testing: intact  Vascular  Capillary refills: < 3 seconds  The right DP pulse is 0  The right PT pulse is 0  Left Foot/Ankle  Left Foot Inspection  Skin Exam: skin intact and dry skinno warmth, no erythema, no maceration, normal color, no pre-ulcer, no ulcer and no callus                                         Sensory       Monofilament: intact  Vascular  Capillary refills: < 3 seconds  The left DP pulse is 0  The left PT pulse is 0  Assign Risk Category:  No deformity present;  No loss of protective sensation; Weak pulses       Risk: 0

## 2019-02-03 PROBLEM — E11.21 TYPE 2 DIABETES MELLITUS WITH DIABETIC NEPHROPATHY, WITH LONG-TERM CURRENT USE OF INSULIN (HCC): Status: ACTIVE | Noted: 2018-01-23

## 2019-02-03 PROBLEM — Z79.4 TYPE 2 DIABETES MELLITUS WITH DIABETIC NEPHROPATHY, WITH LONG-TERM CURRENT USE OF INSULIN (HCC): Status: ACTIVE | Noted: 2018-01-23

## 2019-02-04 ENCOUNTER — TELEPHONE (OUTPATIENT)
Dept: INTERNAL MEDICINE CLINIC | Facility: CLINIC | Age: 84
End: 2019-02-04

## 2020-07-02 ENCOUNTER — DOCUMENTATION (OUTPATIENT)
Dept: AUDIOLOGY | Age: 85
End: 2020-07-02

## 2020-07-02 NOTE — PROGRESS NOTES
Progress Note    Name:  Tl Lockett  :  1935  Age:  81 y o  Date of Evaluation: 20     Scanned documents         Pretty Feldman   Clinical Audiologist

## 2023-11-01 NOTE — PROGRESS NOTES
Body Location Override (Optional): Right dorsal foot [facility] Admission Note   Unit/Bed # @DBLINK (DXW,07521)@ Encounter: [id number]  SOD Team A          Cherry Calle 81 y o  male [id number]       Patient seen and examined  Reviewed H&P per Dr Elda Arzola  Agree with the assessment and plan except where otherwise noted  Assessment/Plan:   Principal Problem:    SOB (shortness of breath)  Active Problems:    Hx of bladder cancer    Anemia of chronic disorder    Benign essential hypertension    Chronic kidney disease, stage IV (severe) (HCC)    Diastolic dysfunction    DM type 2 (diabetes mellitus, type 2) (HCC)    Metabolic acidosis    Diabetic nephropathy (HCC)    Depression    Cough    Fatigue    Dizziness       HPI:  81year-old gentleman, hx of bladder cancer years ago s/p cystoprostatectomy in 2003 with chronic hydronephrosis, poorly controlled DM 2, CKD IV who presents to ER with complaints of shortness of breath, chills, fatigue/weakness and room spinning sensation for the past three days or so  He feels the room spinning when he goes to stand up from a seated position  He also complains of a cough productive of clear sputum, ongoing for the past several months  He was seen in clinic in January for this cough and prescribed Delsym cough syrup at that time  The cough is persistent and causes him to have pleuritic rib pain  Per his son, who was at bedside providing translation and history, he has had subjective weight loss over the past few months  He has also had decreased appetite  Per his son, he only eats minimal amounts of fruit daily and barely any other food  He does drink a lot a water  He continues to use his normal dose of insulin, despite not eating much  He uses Lantus 45 nightly and NovoLog 12 units t i d  with meals  His last A1c in April was 9%  The patient states he does often get hypoglycemic, and his sugars remain in the low 100 range    During his clinic visit in April, he was started on low-dose Zoloft for suspected mild depression  Per clinic report, he was 176 lb in April  His documented weight on admission today is 180 lb  The patient does report that his brother was recently hospitalized with cough and some unspecified lung illness  No recent travel  In the ED, he was afebrile, BP stable, HR normal, satting 99% on RA  BMP is 479  He does have a slight RIZWANA on CKD (baseline mid to high 2's)  Alkaline phosphatase slightly elevated  Glucose 336  Lactic acid was checked and resulted at 2 1, and came down to 1 9 without intervention  He has no WBC elevation  CXR without obvious consolidation or volume overload, mainly unchanged from prior CXR in February  The patient has been using Vicks cough syrup at home with minimal relief of his cough  Plan:  Unclear etiology of ongoing chronic cough, significant fatigue, subjective weight loss & decreased appetite  Now with new SOB  TSH with reflex checked in February was normal  CXR without obvious consolidation or volume overload; lungs CTA bilaterally on exam  No fever or elevated WBC  Check Procalcitonin  Does report a sick contact  Will monitor off antibiotics but check blood cultures x2 and urine strep/legionella  If cough persists, to/consider steroid taper although will increase his glucose temporarily  Check viral respiratory panel - droplet precaution until this results  IVF hydration- re-check Cr in AM - pt does appear visibly dry/dehydrated; If Cr worsens will need nephrology evaluation, check UA  Tessalon perles and promethazine for cough  Possible vertigo- pt has room spinning sensation with change in position  Check orthostatics   Possibly 2/2 low sugars given he is not eating much and using the same amount of insulin consistently  Decreased appetite may be 2/2 ongoing depression- consider adjustment/increase in depression medication dosing  Place on SSI - hold Novolog 12 TID with meals but continue Lantus and monitor glucoses  Check GGT given chronically elevated Alk Phos        Disposition:  OBSERVATION    Expected LOS: <2 98 Cj Ma DO, Internal Medicine PGY-3  7/17/2018 3:31 AM

## (undated) DEVICE — NEEDLE 25G X 1 1/2

## (undated) DEVICE — ADHESIVE SKN CLSR HISTOACRYL FLEX 0.5ML LF

## (undated) DEVICE — CULTURE TUBE ANAEROBIC

## (undated) DEVICE — CHLORAPREP HI-LITE 26ML ORANGE

## (undated) DEVICE — SUT VICRYL 3-0 SH 27 IN J416H

## (undated) DEVICE — SUT MONOCRYL 4-0 PS-2 18 IN Y496G

## (undated) DEVICE — GLOVE INDICATOR PI UNDERGLOVE SZ 7.5 BLUE

## (undated) DEVICE — INTENDED FOR TISSUE SEPARATION, AND OTHER PROCEDURES THAT REQUIRE A SHARP SURGICAL BLADE TO PUNCTURE OR CUT.: Brand: BARD-PARKER SAFETY BLADES SIZE 15, STERILE

## (undated) DEVICE — BETHLEHEM UNIVERSAL MINOR GEN: Brand: CARDINAL HEALTH

## (undated) DEVICE — CULTURE TUBE AEROBIC

## (undated) DEVICE — GLOVE SRG BIOGEL 7.5

## (undated) DEVICE — PLUMEPEN PRO 10FT